# Patient Record
Sex: FEMALE | Race: WHITE | NOT HISPANIC OR LATINO | URBAN - METROPOLITAN AREA
[De-identification: names, ages, dates, MRNs, and addresses within clinical notes are randomized per-mention and may not be internally consistent; named-entity substitution may affect disease eponyms.]

---

## 2021-06-14 ENCOUNTER — TELEPHONE (OUTPATIENT)
Dept: FAMILY MEDICINE | Facility: CLINIC | Age: 56
End: 2021-06-14

## 2021-06-14 DIAGNOSIS — E03.9 HYPOTHYROIDISM, UNSPECIFIED TYPE: ICD-10-CM

## 2021-06-14 DIAGNOSIS — Z00.00 WELLNESS EXAMINATION: Primary | ICD-10-CM

## 2021-06-14 DIAGNOSIS — I10 ESSENTIAL HYPERTENSION: ICD-10-CM

## 2021-06-14 DIAGNOSIS — R73.02 GLUCOSE INTOLERANCE (IMPAIRED GLUCOSE TOLERANCE): ICD-10-CM

## 2021-06-14 RX ORDER — LEVOTHYROXINE SODIUM 88 UG/1
88 TABLET ORAL DAILY
COMMUNITY
Start: 2021-02-02 | End: 2021-06-21 | Stop reason: SDUPTHER

## 2021-06-16 ENCOUNTER — LAB VISIT (OUTPATIENT)
Dept: LAB | Facility: HOSPITAL | Age: 56
End: 2021-06-16
Attending: INTERNAL MEDICINE
Payer: COMMERCIAL

## 2021-06-16 DIAGNOSIS — Z00.00 WELLNESS EXAMINATION: ICD-10-CM

## 2021-06-16 DIAGNOSIS — R73.02 GLUCOSE INTOLERANCE (IMPAIRED GLUCOSE TOLERANCE): ICD-10-CM

## 2021-06-16 DIAGNOSIS — E03.9 HYPOTHYROIDISM, UNSPECIFIED TYPE: ICD-10-CM

## 2021-06-16 LAB
ALBUMIN SERPL BCP-MCNC: 4.1 G/DL (ref 3.5–5.2)
ALP SERPL-CCNC: 58 U/L (ref 55–135)
ALT SERPL W/O P-5'-P-CCNC: 20 U/L (ref 10–44)
ANION GAP SERPL CALC-SCNC: 13 MMOL/L (ref 8–16)
AST SERPL-CCNC: 24 U/L (ref 10–40)
BASOPHILS # BLD AUTO: 0.05 K/UL (ref 0–0.2)
BASOPHILS NFR BLD: 0.7 % (ref 0–1.9)
BILIRUB SERPL-MCNC: 0.5 MG/DL (ref 0.1–1)
BUN SERPL-MCNC: 10 MG/DL (ref 6–20)
CALCIUM SERPL-MCNC: 9.5 MG/DL (ref 8.7–10.5)
CHLORIDE SERPL-SCNC: 106 MMOL/L (ref 95–110)
CHOLEST SERPL-MCNC: 207 MG/DL (ref 120–199)
CHOLEST/HDLC SERPL: 5.3 {RATIO} (ref 2–5)
CO2 SERPL-SCNC: 22 MMOL/L (ref 23–29)
CREAT SERPL-MCNC: 0.7 MG/DL (ref 0.5–1.4)
DIFFERENTIAL METHOD: ABNORMAL
EOSINOPHIL # BLD AUTO: 0.2 K/UL (ref 0–0.5)
EOSINOPHIL NFR BLD: 3.5 % (ref 0–8)
ERYTHROCYTE [DISTWIDTH] IN BLOOD BY AUTOMATED COUNT: 12.8 % (ref 11.5–14.5)
EST. GFR  (AFRICAN AMERICAN): >60 ML/MIN/1.73 M^2
EST. GFR  (NON AFRICAN AMERICAN): >60 ML/MIN/1.73 M^2
ESTIMATED AVG GLUCOSE: 105 MG/DL (ref 68–131)
GLUCOSE SERPL-MCNC: 92 MG/DL (ref 70–110)
HBA1C MFR BLD: 5.3 % (ref 4–5.6)
HCT VFR BLD AUTO: 45.1 % (ref 37–48.5)
HDLC SERPL-MCNC: 39 MG/DL (ref 40–75)
HDLC SERPL: 18.8 % (ref 20–50)
HGB BLD-MCNC: 13.7 G/DL (ref 12–16)
IMM GRANULOCYTES # BLD AUTO: 0.02 K/UL (ref 0–0.04)
IMM GRANULOCYTES NFR BLD AUTO: 0.3 % (ref 0–0.5)
LDLC SERPL CALC-MCNC: 131 MG/DL (ref 63–159)
LYMPHOCYTES # BLD AUTO: 2.1 K/UL (ref 1–4.8)
LYMPHOCYTES NFR BLD: 29.8 % (ref 18–48)
MCH RBC QN AUTO: 30 PG (ref 27–31)
MCHC RBC AUTO-ENTMCNC: 30.4 G/DL (ref 32–36)
MCV RBC AUTO: 99 FL (ref 82–98)
MONOCYTES # BLD AUTO: 0.8 K/UL (ref 0.3–1)
MONOCYTES NFR BLD: 11.9 % (ref 4–15)
NEUTROPHILS # BLD AUTO: 3.7 K/UL (ref 1.8–7.7)
NEUTROPHILS NFR BLD: 53.8 % (ref 38–73)
NONHDLC SERPL-MCNC: 168 MG/DL
NRBC BLD-RTO: 0 /100 WBC
PLATELET # BLD AUTO: 272 K/UL (ref 150–450)
PMV BLD AUTO: 10.4 FL (ref 9.2–12.9)
POTASSIUM SERPL-SCNC: 4.2 MMOL/L (ref 3.5–5.1)
PROT SERPL-MCNC: 7.4 G/DL (ref 6–8.4)
RBC # BLD AUTO: 4.57 M/UL (ref 4–5.4)
SODIUM SERPL-SCNC: 141 MMOL/L (ref 136–145)
T4 FREE SERPL-MCNC: 0.96 NG/DL (ref 0.71–1.51)
TRIGL SERPL-MCNC: 185 MG/DL (ref 30–150)
TSH SERPL DL<=0.005 MIU/L-ACNC: 2.61 UIU/ML (ref 0.4–4)
WBC # BLD AUTO: 6.87 K/UL (ref 3.9–12.7)

## 2021-06-16 PROCEDURE — 80061 LIPID PANEL: CPT | Performed by: INTERNAL MEDICINE

## 2021-06-16 PROCEDURE — 83036 HEMOGLOBIN GLYCOSYLATED A1C: CPT | Performed by: INTERNAL MEDICINE

## 2021-06-16 PROCEDURE — 85025 COMPLETE CBC W/AUTO DIFF WBC: CPT | Performed by: INTERNAL MEDICINE

## 2021-06-16 PROCEDURE — 80053 COMPREHEN METABOLIC PANEL: CPT | Performed by: INTERNAL MEDICINE

## 2021-06-16 PROCEDURE — 36415 COLL VENOUS BLD VENIPUNCTURE: CPT | Mod: PN | Performed by: INTERNAL MEDICINE

## 2021-06-16 PROCEDURE — 84439 ASSAY OF FREE THYROXINE: CPT | Performed by: INTERNAL MEDICINE

## 2021-06-16 PROCEDURE — 84443 ASSAY THYROID STIM HORMONE: CPT | Performed by: INTERNAL MEDICINE

## 2021-06-21 ENCOUNTER — OFFICE VISIT (OUTPATIENT)
Dept: FAMILY MEDICINE | Facility: CLINIC | Age: 56
End: 2021-06-21
Payer: COMMERCIAL

## 2021-06-21 VITALS
SYSTOLIC BLOOD PRESSURE: 130 MMHG | WEIGHT: 192.81 LBS | HEIGHT: 65 IN | DIASTOLIC BLOOD PRESSURE: 70 MMHG | BODY MASS INDEX: 32.12 KG/M2

## 2021-06-21 DIAGNOSIS — E88.810 METABOLIC SYNDROME: ICD-10-CM

## 2021-06-21 DIAGNOSIS — R73.02 GLUCOSE INTOLERANCE (IMPAIRED GLUCOSE TOLERANCE): ICD-10-CM

## 2021-06-21 DIAGNOSIS — G47.33 OSA (OBSTRUCTIVE SLEEP APNEA): ICD-10-CM

## 2021-06-21 DIAGNOSIS — E03.9 HYPOTHYROIDISM, UNSPECIFIED TYPE: ICD-10-CM

## 2021-06-21 DIAGNOSIS — Z00.00 WELLNESS EXAMINATION: Primary | ICD-10-CM

## 2021-06-21 DIAGNOSIS — Z12.11 SCREEN FOR COLON CANCER: ICD-10-CM

## 2021-06-21 DIAGNOSIS — G47.30 HYPERSOMNIA WITH SLEEP APNEA: ICD-10-CM

## 2021-06-21 DIAGNOSIS — E66.9 OBESITY (BMI 30-39.9): ICD-10-CM

## 2021-06-21 DIAGNOSIS — Z12.31 VISIT FOR SCREENING MAMMOGRAM: ICD-10-CM

## 2021-06-21 DIAGNOSIS — E78.2 MIXED HYPERLIPIDEMIA: ICD-10-CM

## 2021-06-21 DIAGNOSIS — G47.10 HYPERSOMNIA WITH SLEEP APNEA: ICD-10-CM

## 2021-06-21 DIAGNOSIS — F17.200 TOBACCO USE DISORDER: ICD-10-CM

## 2021-06-21 PROCEDURE — 3008F PR BODY MASS INDEX (BMI) DOCUMENTED: ICD-10-PCS | Mod: CPTII,S$GLB,, | Performed by: INTERNAL MEDICINE

## 2021-06-21 PROCEDURE — 1126F PR PAIN SEVERITY QUANTIFIED, NO PAIN PRESENT: ICD-10-PCS | Mod: S$GLB,,, | Performed by: INTERNAL MEDICINE

## 2021-06-21 PROCEDURE — 3008F BODY MASS INDEX DOCD: CPT | Mod: CPTII,S$GLB,, | Performed by: INTERNAL MEDICINE

## 2021-06-21 PROCEDURE — 1126F AMNT PAIN NOTED NONE PRSNT: CPT | Mod: S$GLB,,, | Performed by: INTERNAL MEDICINE

## 2021-06-21 PROCEDURE — 99999 PR PBB SHADOW E&M-EST. PATIENT-LVL III: ICD-10-PCS | Mod: PBBFAC,,, | Performed by: INTERNAL MEDICINE

## 2021-06-21 PROCEDURE — 99396 PREV VISIT EST AGE 40-64: CPT | Mod: S$GLB,,, | Performed by: INTERNAL MEDICINE

## 2021-06-21 PROCEDURE — 99396 PR PREVENTIVE VISIT,EST,40-64: ICD-10-PCS | Mod: S$GLB,,, | Performed by: INTERNAL MEDICINE

## 2021-06-21 PROCEDURE — 99999 PR PBB SHADOW E&M-EST. PATIENT-LVL III: CPT | Mod: PBBFAC,,, | Performed by: INTERNAL MEDICINE

## 2021-06-21 RX ORDER — ARMODAFINIL 150 MG/1
150 TABLET ORAL DAILY
Qty: 30 TABLET | Refills: 5 | Status: SHIPPED | OUTPATIENT
Start: 2021-06-21 | End: 2021-07-21

## 2021-06-21 RX ORDER — LEVOTHYROXINE SODIUM 88 UG/1
88 TABLET ORAL DAILY
Qty: 90 TABLET | Refills: 3 | Status: SHIPPED | OUTPATIENT
Start: 2021-06-21 | End: 2022-02-15 | Stop reason: SDUPTHER

## 2021-09-13 LAB — PAP SMEAR: NORMAL

## 2021-10-12 LAB — NONINV COLON CA DNA+OCC BLD SCRN STL QL: NEGATIVE

## 2021-12-29 DIAGNOSIS — Z12.11 COLON CANCER SCREENING: ICD-10-CM

## 2022-02-15 ENCOUNTER — OFFICE VISIT (OUTPATIENT)
Dept: FAMILY MEDICINE | Facility: CLINIC | Age: 57
End: 2022-02-15
Payer: COMMERCIAL

## 2022-02-15 ENCOUNTER — PATIENT OUTREACH (OUTPATIENT)
Dept: ADMINISTRATIVE | Facility: HOSPITAL | Age: 57
End: 2022-02-15
Payer: COMMERCIAL

## 2022-02-15 VITALS
SYSTOLIC BLOOD PRESSURE: 126 MMHG | WEIGHT: 198.06 LBS | DIASTOLIC BLOOD PRESSURE: 74 MMHG | BODY MASS INDEX: 33 KG/M2 | HEIGHT: 65 IN

## 2022-02-15 DIAGNOSIS — E66.9 OBESITY (BMI 30-39.9): ICD-10-CM

## 2022-02-15 DIAGNOSIS — Z00.00 WELLNESS EXAMINATION: Primary | ICD-10-CM

## 2022-02-15 DIAGNOSIS — R73.02 GLUCOSE INTOLERANCE (IMPAIRED GLUCOSE TOLERANCE): ICD-10-CM

## 2022-02-15 DIAGNOSIS — E03.9 HYPOTHYROIDISM, UNSPECIFIED TYPE: ICD-10-CM

## 2022-02-15 DIAGNOSIS — E78.2 MIXED HYPERLIPIDEMIA: ICD-10-CM

## 2022-02-15 DIAGNOSIS — E88.810 METABOLIC SYNDROME: ICD-10-CM

## 2022-02-15 DIAGNOSIS — G47.33 OSA (OBSTRUCTIVE SLEEP APNEA): ICD-10-CM

## 2022-02-15 PROCEDURE — 99214 PR OFFICE/OUTPT VISIT, EST, LEVL IV, 30-39 MIN: ICD-10-PCS | Mod: S$GLB,,, | Performed by: INTERNAL MEDICINE

## 2022-02-15 PROCEDURE — 3074F SYST BP LT 130 MM HG: CPT | Mod: CPTII,S$GLB,, | Performed by: INTERNAL MEDICINE

## 2022-02-15 PROCEDURE — 99999 PR PBB SHADOW E&M-EST. PATIENT-LVL III: CPT | Mod: PBBFAC,,, | Performed by: INTERNAL MEDICINE

## 2022-02-15 PROCEDURE — 3074F PR MOST RECENT SYSTOLIC BLOOD PRESSURE < 130 MM HG: ICD-10-PCS | Mod: CPTII,S$GLB,, | Performed by: INTERNAL MEDICINE

## 2022-02-15 PROCEDURE — 1159F PR MEDICATION LIST DOCUMENTED IN MEDICAL RECORD: ICD-10-PCS | Mod: CPTII,S$GLB,, | Performed by: INTERNAL MEDICINE

## 2022-02-15 PROCEDURE — 3078F PR MOST RECENT DIASTOLIC BLOOD PRESSURE < 80 MM HG: ICD-10-PCS | Mod: CPTII,S$GLB,, | Performed by: INTERNAL MEDICINE

## 2022-02-15 PROCEDURE — 3078F DIAST BP <80 MM HG: CPT | Mod: CPTII,S$GLB,, | Performed by: INTERNAL MEDICINE

## 2022-02-15 PROCEDURE — 1159F MED LIST DOCD IN RCRD: CPT | Mod: CPTII,S$GLB,, | Performed by: INTERNAL MEDICINE

## 2022-02-15 PROCEDURE — 99999 PR PBB SHADOW E&M-EST. PATIENT-LVL III: ICD-10-PCS | Mod: PBBFAC,,, | Performed by: INTERNAL MEDICINE

## 2022-02-15 PROCEDURE — 3008F BODY MASS INDEX DOCD: CPT | Mod: CPTII,S$GLB,, | Performed by: INTERNAL MEDICINE

## 2022-02-15 PROCEDURE — 99214 OFFICE O/P EST MOD 30 MIN: CPT | Mod: S$GLB,,, | Performed by: INTERNAL MEDICINE

## 2022-02-15 PROCEDURE — 3008F PR BODY MASS INDEX (BMI) DOCUMENTED: ICD-10-PCS | Mod: CPTII,S$GLB,, | Performed by: INTERNAL MEDICINE

## 2022-02-15 RX ORDER — LEVOTHYROXINE SODIUM 88 UG/1
88 TABLET ORAL DAILY
Qty: 90 TABLET | Refills: 3 | Status: SHIPPED | OUTPATIENT
Start: 2022-02-15 | End: 2023-02-10 | Stop reason: SDUPTHER

## 2022-02-15 RX ORDER — PHENTERMINE AND TOPIRAMATE 3.75; 23 MG/1; MG/1
1 CAPSULE, EXTENDED RELEASE ORAL DAILY
Qty: 14 CAPSULE | Refills: 0 | Status: SHIPPED | OUTPATIENT
Start: 2022-02-15 | End: 2022-03-01

## 2022-02-15 RX ORDER — PHENTERMINE AND TOPIRAMATE 7.5; 46 MG/1; MG/1
1 CAPSULE, EXTENDED RELEASE ORAL DAILY
Qty: 30 CAPSULE | Refills: 1 | Status: SHIPPED | OUTPATIENT
Start: 2022-02-15 | End: 2022-03-17

## 2022-02-15 NOTE — PROGRESS NOTES
"Subjective:    get old note .      Patient ID: Candice Cohn is a 57 y.o. female.    Chief Complaint: Follow-up  Gyn: Dr Tian   MM   Dexa:  Never  Colonoscopy:  10/2021 cologaurd negative   Immunizations: Flu: no Tdap:  Not given old note, Pneumovax: next visit  Shingles: recommend  Covid: no   Smoker:  No     Follow-up  Pertinent negatives include no chest pain or joint swelling.      Metabolic syndrome: BMI 32 - wants to restart Qsymia- did will in past.  Goal 145-150  lbs   Hypothyroid:  Controlled Rx L 88 TSH 2.6.  TPO negative// ultrasound 2/15 negative.  Mixed hyperlipidemia:  Improved diet changes //   Triglycerides 185, , HDL 39. // Highest .  Glucose intolerance:  Improved G 92 //  // A1c 5.3   ALAN:  Dx 2019 AEH high 28.  //Rx CPAP nightly pressure 5-20 auto PAP.  using greater than 4 hr. mgmt ENT Dr Graham     Review of Systems:  Review of Systems   Constitutional: Negative for appetite change.   HENT: Negative for nosebleeds.    Eyes: Negative for pain.   Respiratory: Negative for choking.    Cardiovascular: Negative for chest pain.   Gastrointestinal: Negative for blood in stool.   Genitourinary: Negative for hematuria.   Musculoskeletal: Negative for joint swelling.   Skin: Negative for pallor.   Neurological: Negative for facial asymmetry.   Hematological: Does not bruise/bleed easily.   Psychiatric/Behavioral: Negative for confusion.       Objective:     Vitals:    02/15/22 0837   BP: 126/74   BP Location: Right arm   Weight: 89.8 kg (198 lb 1.3 oz)   Height: 5' 5" (1.651 m)          Physical Exam  Vitals reviewed.   Constitutional:       Appearance: Normal appearance.   HENT:      Head: Normocephalic and atraumatic.      Mouth/Throat:      Pharynx: Oropharynx is clear.   Eyes:      Extraocular Movements: Extraocular movements intact.      Conjunctiva/sclera: Conjunctivae normal.      Pupils: Pupils are equal, round, and reactive to light.   Cardiovascular: "      Rate and Rhythm: Normal rate and regular rhythm.      Heart sounds: Normal heart sounds.   Pulmonary:      Effort: Pulmonary effort is normal.      Breath sounds: Normal breath sounds.   Abdominal:      General: Bowel sounds are normal.      Palpations: Abdomen is soft.   Musculoskeletal:         General: Normal range of motion.      Cervical back: Normal range of motion and neck supple.   Skin:     General: Skin is warm and dry.   Neurological:      General: No focal deficit present.      Mental Status: She is alert and oriented to person, place, and time.   Psychiatric:         Mood and Affect: Mood normal.         Medication List with Changes/Refills   New Medications    PHENTERMINE-TOPIRAMATE (QSYMIA) 3.75-23 MG CM24    Take 1 capsule by mouth once daily. for 14 days    PHENTERMINE-TOPIRAMATE (QSYMIA) 7.5-46 MG CM24    Take 1 capsule by mouth once daily.   Current Medications    ASCORBIC ACID (VITAMIN C ORAL)    Take by mouth.    BIOTIN ORAL    Take by mouth.    CALCIUM CARBONATE/VITAMIN D3 (VITAMIN D-3 ORAL)    Take by mouth.    CYANOCOBALAMIN, VITAMIN B-12, (VITAMIN B-12 ORAL)    Take by mouth.    FEXOFENADINE HCL (ALLEGRA ORAL)    Take by mouth.   Changed and/or Refilled Medications    Modified Medication Previous Medication    LEVOTHYROXINE (SYNTHROID) 88 MCG TABLET levothyroxine (SYNTHROID) 88 MCG tablet       Take 1 tablet (88 mcg total) by mouth once daily.    Take 1 tablet (88 mcg total) by mouth once daily.       Assessment & Plan:  1. Wellness examination  - Comprehensive Metabolic Panel; Future  - Hemoglobin A1C; Future  - Lipid Panel; Future  - TSH; Future  - T4, Free; Future  - CBC Auto Differential; Future  - Comprehensive Metabolic Panel  - Hemoglobin A1C  - Lipid Panel  - TSH  - T4, Free  - CBC Auto Differential    2. Hypothyroidism, unspecified type  - TSH; Future  - T4, Free; Future  - TSH  - T4, Free    3. Glucose intolerance (impaired glucose tolerance)  - Hemoglobin A1C; Future  -  Hemoglobin A1C    4. ALAN (obstructive sleep apnea)    5. Metabolic syndrome    6. Mixed hyperlipidemia    7. Obesity (BMI 30-39.9)     Wellness examination  -     Comprehensive Metabolic Panel; Future; Expected date: 02/15/2022  -     Hemoglobin A1C; Future; Expected date: 02/15/2022  -     Lipid Panel; Future; Expected date: 02/15/2022  -     TSH; Future; Expected date: 02/15/2022  -     T4, Free; Future; Expected date: 02/15/2022  -     CBC Auto Differential; Future; Expected date: 02/15/2022    Hypothyroidism, unspecified type  -     TSH; Future; Expected date: 02/15/2022  -     T4, Free; Future; Expected date: 02/15/2022    Glucose intolerance (impaired glucose tolerance)  -     Hemoglobin A1C; Future; Expected date: 02/15/2022    ALAN (obstructive sleep apnea)    Metabolic syndrome    Mixed hyperlipidemia    Obesity (BMI 30-39.9)    Other orders  -     phentermine-topiramate (QSYMIA) 3.75-23 mg CM24; Take 1 capsule by mouth once daily. for 14 days  Dispense: 14 capsule; Refill: 0  -     phentermine-topiramate (QSYMIA) 7.5-46 mg CM24; Take 1 capsule by mouth once daily.  Dispense: 30 capsule; Refill: 1  -     levothyroxine (SYNTHROID) 88 MCG tablet; Take 1 tablet (88 mcg total) by mouth once daily.  Dispense: 90 tablet; Refill: 3        Continue to work on regular exercise, maintain healthy weight, balanced diet. Avoid unhealthy habits: smoking, excessive alcohol intake.

## 2022-02-15 NOTE — LETTER
AUTHORIZATION FOR RELEASE OF   CONFIDENTIAL INFORMATION    Dear Kodak Tian MD    We are seeing Candice Cohn, date of birth 1965, in the clinic at [facility]. Levi Addison MD is the patient's PCP. Candice Cohn has an outstanding lab/procedure at the time we reviewed her chart. In order to help keep her health information updated, she has authorized us to request the following medical record(s):        (  )  MAMMOGRAM                                      (  )  COLONOSCOPY      (X)  PAP SMEAR                                          (  )  OUTSIDE LAB RESULTS     (  )  DEXA SCAN                                          (  )  EYE EXAM            (  )  FOOT EXAM                                          (  )  ENTIRE RECORD     (  )  OUTSIDE IMMUNIZATIONS                 (  )  _______________         Please fax records to [facility], Brandon D Simon-Davis, MD, [phone]    If you have any questions, please contact Dionicio Self LPN Care Coordinator  at [phone].              Patient Name: Candice Cohn  : 1965  Patient Phone #: [phone]

## 2022-02-21 ENCOUNTER — PATIENT MESSAGE (OUTPATIENT)
Dept: FAMILY MEDICINE | Facility: CLINIC | Age: 57
End: 2022-02-21
Payer: COMMERCIAL

## 2022-02-21 DIAGNOSIS — Z00.00 WELLNESS EXAMINATION: Primary | ICD-10-CM

## 2022-02-21 DIAGNOSIS — E03.9 HYPOTHYROIDISM, UNSPECIFIED TYPE: ICD-10-CM

## 2022-02-21 DIAGNOSIS — R73.02 GLUCOSE INTOLERANCE (IMPAIRED GLUCOSE TOLERANCE): ICD-10-CM

## 2022-07-06 DIAGNOSIS — Z12.31 OTHER SCREENING MAMMOGRAM: ICD-10-CM

## 2022-07-21 ENCOUNTER — PATIENT MESSAGE (OUTPATIENT)
Dept: FAMILY MEDICINE | Facility: CLINIC | Age: 57
End: 2022-07-21
Payer: COMMERCIAL

## 2022-07-21 DIAGNOSIS — E03.9 HYPOTHYROIDISM, UNSPECIFIED TYPE: ICD-10-CM

## 2022-07-21 DIAGNOSIS — R73.02 GLUCOSE INTOLERANCE (IMPAIRED GLUCOSE TOLERANCE): ICD-10-CM

## 2022-07-21 DIAGNOSIS — E78.2 MIXED HYPERLIPIDEMIA: ICD-10-CM

## 2022-07-21 DIAGNOSIS — E66.9 OBESITY (BMI 30-39.9): Primary | ICD-10-CM

## 2022-07-21 DIAGNOSIS — Z00.00 WELLNESS EXAMINATION: ICD-10-CM

## 2022-08-15 ENCOUNTER — OFFICE VISIT (OUTPATIENT)
Dept: FAMILY MEDICINE | Facility: CLINIC | Age: 57
End: 2022-08-15
Payer: COMMERCIAL

## 2022-08-15 VITALS
HEART RATE: 66 BPM | WEIGHT: 194.25 LBS | SYSTOLIC BLOOD PRESSURE: 122 MMHG | HEIGHT: 65 IN | DIASTOLIC BLOOD PRESSURE: 72 MMHG | BODY MASS INDEX: 32.36 KG/M2 | OXYGEN SATURATION: 98 %

## 2022-08-15 DIAGNOSIS — E03.9 HYPOTHYROIDISM, UNSPECIFIED TYPE: ICD-10-CM

## 2022-08-15 DIAGNOSIS — Z01.89 ENCOUNTER FOR ROUTINE LABORATORY TESTING: ICD-10-CM

## 2022-08-15 DIAGNOSIS — E78.2 MIXED HYPERLIPIDEMIA: ICD-10-CM

## 2022-08-15 DIAGNOSIS — Z11.59 NEED FOR HEPATITIS C SCREENING TEST: ICD-10-CM

## 2022-08-15 DIAGNOSIS — E66.9 OBESITY (BMI 30-39.9): ICD-10-CM

## 2022-08-15 DIAGNOSIS — R73.02 GLUCOSE INTOLERANCE (IMPAIRED GLUCOSE TOLERANCE): Primary | ICD-10-CM

## 2022-08-15 PROCEDURE — 99214 OFFICE O/P EST MOD 30 MIN: CPT | Mod: S$GLB,,, | Performed by: INTERNAL MEDICINE

## 2022-08-15 PROCEDURE — 3078F PR MOST RECENT DIASTOLIC BLOOD PRESSURE < 80 MM HG: ICD-10-PCS | Mod: CPTII,S$GLB,, | Performed by: INTERNAL MEDICINE

## 2022-08-15 PROCEDURE — 3044F PR MOST RECENT HEMOGLOBIN A1C LEVEL <7.0%: ICD-10-PCS | Mod: CPTII,S$GLB,, | Performed by: INTERNAL MEDICINE

## 2022-08-15 PROCEDURE — 1159F MED LIST DOCD IN RCRD: CPT | Mod: CPTII,S$GLB,, | Performed by: INTERNAL MEDICINE

## 2022-08-15 PROCEDURE — 3078F DIAST BP <80 MM HG: CPT | Mod: CPTII,S$GLB,, | Performed by: INTERNAL MEDICINE

## 2022-08-15 PROCEDURE — 1160F PR REVIEW ALL MEDS BY PRESCRIBER/CLIN PHARMACIST DOCUMENTED: ICD-10-PCS | Mod: CPTII,S$GLB,, | Performed by: INTERNAL MEDICINE

## 2022-08-15 PROCEDURE — 3008F PR BODY MASS INDEX (BMI) DOCUMENTED: ICD-10-PCS | Mod: CPTII,S$GLB,, | Performed by: INTERNAL MEDICINE

## 2022-08-15 PROCEDURE — 1160F RVW MEDS BY RX/DR IN RCRD: CPT | Mod: CPTII,S$GLB,, | Performed by: INTERNAL MEDICINE

## 2022-08-15 PROCEDURE — 99999 PR PBB SHADOW E&M-EST. PATIENT-LVL III: CPT | Mod: PBBFAC,,, | Performed by: INTERNAL MEDICINE

## 2022-08-15 PROCEDURE — 3008F BODY MASS INDEX DOCD: CPT | Mod: CPTII,S$GLB,, | Performed by: INTERNAL MEDICINE

## 2022-08-15 PROCEDURE — 3074F PR MOST RECENT SYSTOLIC BLOOD PRESSURE < 130 MM HG: ICD-10-PCS | Mod: CPTII,S$GLB,, | Performed by: INTERNAL MEDICINE

## 2022-08-15 PROCEDURE — 99999 PR PBB SHADOW E&M-EST. PATIENT-LVL III: ICD-10-PCS | Mod: PBBFAC,,, | Performed by: INTERNAL MEDICINE

## 2022-08-15 PROCEDURE — 99214 PR OFFICE/OUTPT VISIT, EST, LEVL IV, 30-39 MIN: ICD-10-PCS | Mod: S$GLB,,, | Performed by: INTERNAL MEDICINE

## 2022-08-15 PROCEDURE — 1159F PR MEDICATION LIST DOCUMENTED IN MEDICAL RECORD: ICD-10-PCS | Mod: CPTII,S$GLB,, | Performed by: INTERNAL MEDICINE

## 2022-08-15 PROCEDURE — 3074F SYST BP LT 130 MM HG: CPT | Mod: CPTII,S$GLB,, | Performed by: INTERNAL MEDICINE

## 2022-08-15 PROCEDURE — 3044F HG A1C LEVEL LT 7.0%: CPT | Mod: CPTII,S$GLB,, | Performed by: INTERNAL MEDICINE

## 2022-08-15 RX ORDER — ONDANSETRON 4 MG/1
4 TABLET, ORALLY DISINTEGRATING ORAL EVERY 6 HOURS PRN
Qty: 30 TABLET | Refills: 0 | Status: SHIPPED | OUTPATIENT
Start: 2022-08-15

## 2022-08-15 RX ORDER — SEMAGLUTIDE 1.34 MG/ML
INJECTION, SOLUTION SUBCUTANEOUS
Qty: 2 PEN | Refills: 3 | Status: SHIPPED | OUTPATIENT
Start: 2022-08-15 | End: 2023-02-10 | Stop reason: SDUPTHER

## 2022-08-15 NOTE — PROGRESS NOTES
"Subjective:    old note in      Patient ID: Candice Cohn is a 57 y.o. female.    Chief Complaint: Follow-up (Blood work)  Gyn: Dr Tian   MM2022   Dexa:  Never  Colonoscopy:  10/2021 cologaurd negative   Immunizations: Flu: defers Tdap:  Not on old note due Pneumovax: next visit  Shingles: recommend  Covid: no   Smoker:  Yes      Follow-up  Pertinent negatives include no chest pain or joint swelling.      F/u labs    Hypothyroid:  Controlled Rx L 88  TPO negative// ultrasound 2/15 negative.  Mixed hyperlipidemia: uncontrolled LDL  /  , Tg  176    Glucose intolerance: increased  G 119 //  A1c 5.9  (hx of using Metformin)   ALAN:  Dx 2019 AEH high 28.  //Rx CPAP nightly pressure 5-20 auto PAP.  using greater than 4 hr. mgmt ENT Dr Graham   Metabolic syndrome: BMI 32 weight 194/// prev took Qsymia  Goal 145-150  lbs     Review of Systems:  Review of Systems   Constitutional: Negative for appetite change.   HENT: Negative for nosebleeds.    Eyes: Negative for pain.   Respiratory: Negative for choking.    Cardiovascular: Negative for chest pain.   Gastrointestinal: Negative for blood in stool.   Genitourinary: Negative for hematuria.   Musculoskeletal: Negative for joint swelling.   Skin: Negative for pallor.   Neurological: Negative for facial asymmetry.   Hematological: Does not bruise/bleed easily.   Psychiatric/Behavioral: Negative for confusion.       Objective:     Vitals:    08/15/22 1119   BP: 122/72   Pulse: 66   SpO2: 98%   Weight: 88.1 kg (194 lb 3.6 oz)   Height: 5' 5" (1.651 m)          Physical Exam  Vitals reviewed.   Constitutional:       Appearance: Normal appearance.   HENT:      Head: Normocephalic and atraumatic.      Mouth/Throat:      Pharynx: Oropharynx is clear.   Eyes:      Extraocular Movements: Extraocular movements intact.      Conjunctiva/sclera: Conjunctivae normal.      Pupils: Pupils are equal, round, and reactive to light.   Cardiovascular:      Rate and " Rhythm: Normal rate and regular rhythm.      Heart sounds: Normal heart sounds.   Pulmonary:      Effort: Pulmonary effort is normal.      Breath sounds: Normal breath sounds.   Abdominal:      General: Bowel sounds are normal.      Palpations: Abdomen is soft.   Musculoskeletal:         General: Normal range of motion.      Cervical back: Normal range of motion and neck supple.   Skin:     General: Skin is warm and dry.   Neurological:      General: No focal deficit present.      Mental Status: She is alert and oriented to person, place, and time.   Psychiatric:         Mood and Affect: Mood normal.         Medication List with Changes/Refills   New Medications    ONDANSETRON (ZOFRAN-ODT) 4 MG TBDL    Take 1 tablet (4 mg total) by mouth every 6 (six) hours as needed (nausea).    SEMAGLUTIDE (OZEMPIC) 0.25 MG OR 0.5 MG(2 MG/1.5 ML) PEN INJECTOR    Inject 0.25 mg SQ weekly x 4 wks; then increase to 0.5 mg SQ weekly   Current Medications    ASCORBIC ACID (VITAMIN C ORAL)    Take by mouth.    BIOTIN ORAL    Take by mouth.    CALCIUM CARBONATE/VITAMIN D3 (VITAMIN D-3 ORAL)    Take by mouth.    CYANOCOBALAMIN, VITAMIN B-12, (VITAMIN B-12 ORAL)    Take by mouth.    FEXOFENADINE HCL (ALLEGRA ORAL)    Take by mouth.    LEVOTHYROXINE (SYNTHROID) 88 MCG TABLET    Take 1 tablet (88 mcg total) by mouth once daily.       Assessment & Plan:  1. Glucose intolerance (impaired glucose tolerance)  - semaglutide (OZEMPIC) 0.25 mg or 0.5 mg(2 mg/1.5 mL) pen injector; Inject 0.25 mg SQ weekly x 4 wks; then increase to 0.5 mg SQ weekly  Dispense: 2 pen; Refill: 3  - Comprehensive Metabolic Panel; Future  - Hemoglobin A1C; Future    2. Mixed hyperlipidemia  - Lipid Panel; Future    3. Hypothyroidism, unspecified type  - T4, Free; Future    4. Need for hepatitis C screening test  - Hepatitis C Antibody; Future    5. Encounter for routine laboratory testing  - CBC Auto Differential; Future  - Comprehensive Metabolic Panel; Future  -  Hemoglobin A1C; Future  - Lipid Panel; Future  - TSH; Future  - T4, Free; Future  - Hepatitis C Antibody; Future    6. Obesity (BMI 30-39.9)     Glucose intolerance (impaired glucose tolerance)  Comments:  start Ozempic - prev took Metformin   Orders:  -     semaglutide (OZEMPIC) 0.25 mg or 0.5 mg(2 mg/1.5 mL) pen injector; Inject 0.25 mg SQ weekly x 4 wks; then increase to 0.5 mg SQ weekly  Dispense: 2 pen; Refill: 3  -     Comprehensive Metabolic Panel; Future; Expected date: 08/15/2022  -     Hemoglobin A1C; Future; Expected date: 08/15/2022    Mixed hyperlipidemia  Comments:  add Red Yeast Rice   Orders:  -     Lipid Panel; Future; Expected date: 08/15/2022    Hypothyroidism, unspecified type  -     T4, Free; Future; Expected date: 08/15/2022    Need for hepatitis C screening test  -     Hepatitis C Antibody; Future; Expected date: 08/15/2022    Encounter for routine laboratory testing  -     CBC Auto Differential; Future; Expected date: 08/15/2022  -     Comprehensive Metabolic Panel; Future; Expected date: 08/15/2022  -     Hemoglobin A1C; Future; Expected date: 08/15/2022  -     Lipid Panel; Future; Expected date: 08/15/2022  -     TSH; Future; Expected date: 08/15/2022  -     T4, Free; Future; Expected date: 08/15/2022  -     Hepatitis C Antibody; Future; Expected date: 08/15/2022    Obesity (BMI 30-39.9)    Other orders  -     ondansetron (ZOFRAN-ODT) 4 MG TbDL; Take 1 tablet (4 mg total) by mouth every 6 (six) hours as needed (nausea).  Dispense: 30 tablet; Refill: 0        Continue to work on regular exercise, maintain healthy weight, balanced diet. Avoid unhealthy habits: smoking, excessive alcohol intake.

## 2022-12-07 ENCOUNTER — TELEPHONE (OUTPATIENT)
Dept: FAMILY MEDICINE | Facility: CLINIC | Age: 57
End: 2022-12-07
Payer: COMMERCIAL

## 2022-12-07 NOTE — TELEPHONE ENCOUNTER
Scheduled appt for 2/7/23. Notified pt blood work orders available to do fasting 1 week prior to appt.

## 2022-12-07 NOTE — TELEPHONE ENCOUNTER
----- Message from Mary Helms sent at 12/7/2022 11:25 AM CST -----  Contact: [phone]    Patient: Candice Cohn    Reason: She is requesting a call back from the nurse to get assistance with scheduling a 6 month     follow-up appointment.    Comments: Please call the patient back to advise.

## 2023-02-07 ENCOUNTER — OFFICE VISIT (OUTPATIENT)
Dept: FAMILY MEDICINE | Facility: CLINIC | Age: 58
End: 2023-02-07
Payer: COMMERCIAL

## 2023-02-07 VITALS
HEART RATE: 74 BPM | OXYGEN SATURATION: 97 % | SYSTOLIC BLOOD PRESSURE: 118 MMHG | DIASTOLIC BLOOD PRESSURE: 84 MMHG | BODY MASS INDEX: 31.59 KG/M2 | WEIGHT: 189.63 LBS | RESPIRATION RATE: 16 BRPM | HEIGHT: 65 IN

## 2023-02-07 DIAGNOSIS — E78.2 MIXED HYPERLIPIDEMIA: ICD-10-CM

## 2023-02-07 DIAGNOSIS — E55.9 VITAMIN D DEFICIENCY: ICD-10-CM

## 2023-02-07 DIAGNOSIS — R73.02 GLUCOSE INTOLERANCE (IMPAIRED GLUCOSE TOLERANCE): ICD-10-CM

## 2023-02-07 DIAGNOSIS — R73.03 PRE-DIABETES: ICD-10-CM

## 2023-02-07 DIAGNOSIS — Z00.00 WELLNESS EXAMINATION: Primary | ICD-10-CM

## 2023-02-07 DIAGNOSIS — G47.33 OSA (OBSTRUCTIVE SLEEP APNEA): ICD-10-CM

## 2023-02-07 DIAGNOSIS — Z01.89 ENCOUNTER FOR ROUTINE LABORATORY TESTING: ICD-10-CM

## 2023-02-07 DIAGNOSIS — E03.9 HYPOTHYROIDISM, UNSPECIFIED TYPE: ICD-10-CM

## 2023-02-07 PROCEDURE — 99999 PR PBB SHADOW E&M-EST. PATIENT-LVL III: ICD-10-PCS | Mod: PBBFAC,,, | Performed by: INTERNAL MEDICINE

## 2023-02-07 PROCEDURE — 3079F DIAST BP 80-89 MM HG: CPT | Mod: CPTII,S$GLB,, | Performed by: INTERNAL MEDICINE

## 2023-02-07 PROCEDURE — 99396 PR PREVENTIVE VISIT,EST,40-64: ICD-10-PCS | Mod: S$GLB,,, | Performed by: INTERNAL MEDICINE

## 2023-02-07 PROCEDURE — 3008F BODY MASS INDEX DOCD: CPT | Mod: CPTII,S$GLB,, | Performed by: INTERNAL MEDICINE

## 2023-02-07 PROCEDURE — 3079F PR MOST RECENT DIASTOLIC BLOOD PRESSURE 80-89 MM HG: ICD-10-PCS | Mod: CPTII,S$GLB,, | Performed by: INTERNAL MEDICINE

## 2023-02-07 PROCEDURE — 99999 PR PBB SHADOW E&M-EST. PATIENT-LVL III: CPT | Mod: PBBFAC,,, | Performed by: INTERNAL MEDICINE

## 2023-02-07 PROCEDURE — 1159F MED LIST DOCD IN RCRD: CPT | Mod: CPTII,S$GLB,, | Performed by: INTERNAL MEDICINE

## 2023-02-07 PROCEDURE — 1160F RVW MEDS BY RX/DR IN RCRD: CPT | Mod: CPTII,S$GLB,, | Performed by: INTERNAL MEDICINE

## 2023-02-07 PROCEDURE — 99396 PREV VISIT EST AGE 40-64: CPT | Mod: S$GLB,,, | Performed by: INTERNAL MEDICINE

## 2023-02-07 PROCEDURE — 1160F PR REVIEW ALL MEDS BY PRESCRIBER/CLIN PHARMACIST DOCUMENTED: ICD-10-PCS | Mod: CPTII,S$GLB,, | Performed by: INTERNAL MEDICINE

## 2023-02-07 PROCEDURE — 3044F PR MOST RECENT HEMOGLOBIN A1C LEVEL <7.0%: ICD-10-PCS | Mod: CPTII,S$GLB,, | Performed by: INTERNAL MEDICINE

## 2023-02-07 PROCEDURE — 3044F HG A1C LEVEL LT 7.0%: CPT | Mod: CPTII,S$GLB,, | Performed by: INTERNAL MEDICINE

## 2023-02-07 PROCEDURE — 3008F PR BODY MASS INDEX (BMI) DOCUMENTED: ICD-10-PCS | Mod: CPTII,S$GLB,, | Performed by: INTERNAL MEDICINE

## 2023-02-07 PROCEDURE — 3074F PR MOST RECENT SYSTOLIC BLOOD PRESSURE < 130 MM HG: ICD-10-PCS | Mod: CPTII,S$GLB,, | Performed by: INTERNAL MEDICINE

## 2023-02-07 PROCEDURE — 1159F PR MEDICATION LIST DOCUMENTED IN MEDICAL RECORD: ICD-10-PCS | Mod: CPTII,S$GLB,, | Performed by: INTERNAL MEDICINE

## 2023-02-07 PROCEDURE — 3074F SYST BP LT 130 MM HG: CPT | Mod: CPTII,S$GLB,, | Performed by: INTERNAL MEDICINE

## 2023-02-07 NOTE — PROGRESS NOTES
Subjective:       Patient ID: Candice Cohn is a 58 y.o. female.    Chief Complaint: Follow-up and Annual Exam   Follow-up  Pertinent negatives include no chest pain or joint swelling.     Gyn: Dr Tian  MM2022   Dexa:  Never  Colonoscopy:  10/2021 cologaurd negative   Immunizations: Flu: defers Tdap:  Not on old note due Pneumovax: next visit  Shingles: recommend  Covid: no   Smoker:  Yes    Eye:   Note in     Wellness / lab review      Hypothyroid:  suppressed- same rx dose for years.  (Cut to 6 pills wkly) //   Rx Levo 88  TPO negative// ultrasound 2/15 negative.    Mixed hyperlipidemia: uncontrolled (highest L 184) never took meds.  Add fish oil and fiber supplement. R/c consider CT calcium     Glucose intolerance: A1c 5.2   (hx of using Metformin) Rx Ozempic 0.5     ALAN:  Dx 2019 AEH high 28.  //Rx CPAP nightly pressure 5-20 auto PAP.  using greater than 4 hr. mgmt ENT Dr Graham     Metabolic syndrome: BMI 32 & 194, 31 & 189. // prev took Qsymia  Goal 145-150  lbs        ____________________________________________________________________________________________________  Assessment & Plan:  1. Wellness examination    2. Mixed hyperlipidemia  - Lipid Panel; Future    3. Pre-diabetes    4. Glucose intolerance (impaired glucose tolerance)  - Hemoglobin A1C; Future  - Comprehensive Metabolic Panel; Future    5. Hypothyroidism, unspecified type  - TSH; Future  - T4, Free; Future    6. ALAN (obstructive sleep apnea)    7. Encounter for routine laboratory testing  - Lipid Panel; Future  - Hemoglobin A1C; Future  - Comprehensive Metabolic Panel; Future  - TSH; Future  - T4, Free; Future  - Vitamin D; Future    8. Vitamin D deficiency  - Vitamin D; Future     Wellness examination    Mixed hyperlipidemia  -     Lipid Panel; Future; Expected date: 2023    Pre-diabetes    Glucose intolerance (impaired glucose tolerance)  -     Hemoglobin A1C; Future; Expected date: 2023  -      Comprehensive Metabolic Panel; Future; Expected date: 02/07/2023    Hypothyroidism, unspecified type  -     TSH; Future; Expected date: 02/07/2023  -     T4, Free; Future; Expected date: 02/07/2023    ALAN (obstructive sleep apnea)    Encounter for routine laboratory testing  -     Lipid Panel; Future; Expected date: 02/07/2023  -     Hemoglobin A1C; Future; Expected date: 02/07/2023  -     Comprehensive Metabolic Panel; Future; Expected date: 02/07/2023  -     TSH; Future; Expected date: 02/07/2023  -     T4, Free; Future; Expected date: 02/07/2023  -     Vitamin D; Future; Expected date: 02/07/2023    Vitamin D deficiency  -     Vitamin D; Future; Expected date: 02/07/2023        Continue to work on regular exercise, maintain healthy weight, balanced diet. Avoid unhealthy habits: smoking, excessive alcohol intake.     Disclaimer: This note was partly generated using dictation software which may occasionally result in transcription errors  ____________________________________________________________________________________________________  Review of Systems:  Review of Systems   Constitutional:  Negative for appetite change.   HENT:  Negative for nosebleeds.    Eyes:  Negative for pain.   Respiratory:  Negative for choking.    Cardiovascular:  Negative for chest pain.   Gastrointestinal:  Negative for blood in stool.   Genitourinary:  Negative for hematuria.   Musculoskeletal:  Negative for joint swelling.   Skin:  Negative for pallor.   Neurological:  Negative for facial asymmetry.   Hematological:  Does not bruise/bleed easily.   Psychiatric/Behavioral:  Negative for confusion.      Objective:     Wt Readings from Last 3 Encounters:   02/07/23 86 kg (189 lb 9.5 oz)   08/15/22 88.1 kg (194 lb 3.6 oz)   02/15/22 89.8 kg (198 lb 1.3 oz)     BP Readings from Last 3 Encounters:   02/07/23 118/84   08/15/22 122/72   02/15/22 126/74       Lab Results   Component Value Date    WBC 6.00 02/06/2023    HGB 14.9 02/06/2023     HCT 45.5 02/06/2023     02/06/2023     02/06/2023    K 4.4 02/06/2023     02/06/2023    ALT 21 02/06/2023    AST 30 02/06/2023    CO2 30 02/06/2023    CREATININE 0.72 02/06/2023    BUN 13 02/06/2023     (H) 02/06/2023      Hemoglobin A1C   Date Value Ref Range Status   02/06/2023 5.2 0.0 - 5.6 % Final     Comment:     Reference Interval:  5.0 - 5.6 Normal   5.7 - 6.4 High Risk   > 6.5 Diabetic      Hgb A1c results are standardized based on the (NGSP) National   Glycohemoglobin Standardization Program.      Hemoglobin A1C levels are related to mean serum/plasma glucose   during the preceding 2-3 months.        08/10/2022 5.9 (H) 0.0 - 5.6 % Final     Comment:     Reference Interval:  5.0 - 5.6 Normal   5.7 - 6.4 High Risk   > 6.5 Diabetic      Hgb A1c results are standardized based on the (NGSP) National   Glycohemoglobin Standardization Program.      Hemoglobin A1C levels are related to mean serum/plasma glucose   during the preceding 2-3 months.        03/26/2022 5.7 (H) 0.0 - 5.6 % Final     Comment:     Reference Interval:  5.0 - 5.6 Normal   5.7 - 6.4 High Risk   > 6.5 Diabetic      Hgb A1c results are standardized based on the (NGSP) National   Glycohemoglobin Standardization Program.      Hemoglobin A1C levels are related to mean serum/plasma glucose   during the preceding 2-3 months.           Lab Results   Component Value Date    TSH <0.015 (L) 02/06/2023    TSH 0.906 08/10/2022    TSH 0.710 03/26/2022     Lab Results   Component Value Date    FREET4 1.17 02/06/2023    FREET4 1.07 08/10/2022    FREET4 1.19 03/26/2022     Lab Results   Component Value Date    LDLCALC 184.4 (H) 02/06/2023    LDLCALC 152.8 08/10/2022    LDLCALC 83.6 03/26/2022     Lab Results   Component Value Date    TRIG 193 (H) 02/06/2023    TRIG 176 (H) 08/10/2022    TRIG 182 (H) 03/26/2022            Physical Exam  Constitutional:       Appearance: Normal appearance.   HENT:      Head: Normocephalic and  atraumatic.   Eyes:      Extraocular Movements: Extraocular movements intact.      Pupils: Pupils are equal, round, and reactive to light.   Cardiovascular:      Rate and Rhythm: Normal rate.   Pulmonary:      Effort: Pulmonary effort is normal.   Neurological:      Mental Status: She is alert and oriented to person, place, and time.   Psychiatric:         Mood and Affect: Mood normal.       Medication List with Changes/Refills   Current Medications    ASCORBIC ACID (VITAMIN C ORAL)    Take by mouth.    BIOTIN ORAL    Take by mouth.    CALCIUM CARBONATE/VITAMIN D3 (VITAMIN D-3 ORAL)    Take by mouth.    CYANOCOBALAMIN, VITAMIN B-12, (VITAMIN B-12 ORAL)    Take by mouth.    FEXOFENADINE HCL (ALLEGRA ORAL)    Take by mouth.    LEVOTHYROXINE (SYNTHROID) 88 MCG TABLET    Take 1 tablet (88 mcg total) by mouth once daily.    ONDANSETRON (ZOFRAN-ODT) 4 MG TBDL    Take 1 tablet (4 mg total) by mouth every 6 (six) hours as needed (nausea).    SEMAGLUTIDE (OZEMPIC) 0.25 MG OR 0.5 MG(2 MG/1.5 ML) PEN INJECTOR    Inject 0.25 mg SQ weekly x 4 wks; then increase to 0.5 mg SQ weekly

## 2023-02-10 PROBLEM — R73.03 PRE-DIABETES: Status: ACTIVE | Noted: 2023-02-10

## 2023-02-10 RX ORDER — SEMAGLUTIDE 1.34 MG/ML
0.5 INJECTION, SOLUTION SUBCUTANEOUS
Qty: 2 PEN | Refills: 6 | Status: SHIPPED | OUTPATIENT
Start: 2023-02-10 | End: 2023-08-07

## 2023-02-10 RX ORDER — LEVOTHYROXINE SODIUM 88 UG/1
88 TABLET ORAL DAILY
Qty: 90 TABLET | Refills: 3 | Status: SHIPPED | OUTPATIENT
Start: 2023-02-10 | End: 2024-02-26 | Stop reason: SDUPTHER

## 2023-05-24 ENCOUNTER — PATIENT MESSAGE (OUTPATIENT)
Dept: FAMILY MEDICINE | Facility: CLINIC | Age: 58
End: 2023-05-24
Payer: COMMERCIAL

## 2023-07-26 DIAGNOSIS — Z12.31 OTHER SCREENING MAMMOGRAM: ICD-10-CM

## 2023-07-31 ENCOUNTER — PATIENT MESSAGE (OUTPATIENT)
Dept: ADMINISTRATIVE | Facility: HOSPITAL | Age: 58
End: 2023-07-31
Payer: COMMERCIAL

## 2023-08-07 ENCOUNTER — OFFICE VISIT (OUTPATIENT)
Dept: FAMILY MEDICINE | Facility: CLINIC | Age: 58
End: 2023-08-07
Payer: COMMERCIAL

## 2023-08-07 VITALS
HEIGHT: 65 IN | RESPIRATION RATE: 16 BRPM | OXYGEN SATURATION: 97 % | DIASTOLIC BLOOD PRESSURE: 58 MMHG | WEIGHT: 196.63 LBS | SYSTOLIC BLOOD PRESSURE: 130 MMHG | HEART RATE: 76 BPM | BODY MASS INDEX: 32.76 KG/M2

## 2023-08-07 DIAGNOSIS — G47.33 OSA (OBSTRUCTIVE SLEEP APNEA): ICD-10-CM

## 2023-08-07 DIAGNOSIS — E66.9 OBESITY (BMI 30-39.9): ICD-10-CM

## 2023-08-07 DIAGNOSIS — R73.03 PRE-DIABETES: ICD-10-CM

## 2023-08-07 DIAGNOSIS — Z00.00 WELLNESS EXAMINATION: ICD-10-CM

## 2023-08-07 DIAGNOSIS — E03.9 HYPOTHYROIDISM, UNSPECIFIED TYPE: ICD-10-CM

## 2023-08-07 DIAGNOSIS — E78.2 MIXED HYPERLIPIDEMIA: Primary | ICD-10-CM

## 2023-08-07 PROCEDURE — 3078F DIAST BP <80 MM HG: CPT | Mod: CPTII,S$GLB,, | Performed by: INTERNAL MEDICINE

## 2023-08-07 PROCEDURE — 1159F MED LIST DOCD IN RCRD: CPT | Mod: CPTII,S$GLB,, | Performed by: INTERNAL MEDICINE

## 2023-08-07 PROCEDURE — 99214 OFFICE O/P EST MOD 30 MIN: CPT | Mod: S$GLB,,, | Performed by: INTERNAL MEDICINE

## 2023-08-07 PROCEDURE — 3044F HG A1C LEVEL LT 7.0%: CPT | Mod: CPTII,S$GLB,, | Performed by: INTERNAL MEDICINE

## 2023-08-07 PROCEDURE — 3075F SYST BP GE 130 - 139MM HG: CPT | Mod: CPTII,S$GLB,, | Performed by: INTERNAL MEDICINE

## 2023-08-07 PROCEDURE — 99214 PR OFFICE/OUTPT VISIT, EST, LEVL IV, 30-39 MIN: ICD-10-PCS | Mod: S$GLB,,, | Performed by: INTERNAL MEDICINE

## 2023-08-07 PROCEDURE — 1160F PR REVIEW ALL MEDS BY PRESCRIBER/CLIN PHARMACIST DOCUMENTED: ICD-10-PCS | Mod: CPTII,S$GLB,, | Performed by: INTERNAL MEDICINE

## 2023-08-07 PROCEDURE — 3008F BODY MASS INDEX DOCD: CPT | Mod: CPTII,S$GLB,, | Performed by: INTERNAL MEDICINE

## 2023-08-07 PROCEDURE — 1159F PR MEDICATION LIST DOCUMENTED IN MEDICAL RECORD: ICD-10-PCS | Mod: CPTII,S$GLB,, | Performed by: INTERNAL MEDICINE

## 2023-08-07 PROCEDURE — 3008F PR BODY MASS INDEX (BMI) DOCUMENTED: ICD-10-PCS | Mod: CPTII,S$GLB,, | Performed by: INTERNAL MEDICINE

## 2023-08-07 PROCEDURE — 3044F PR MOST RECENT HEMOGLOBIN A1C LEVEL <7.0%: ICD-10-PCS | Mod: CPTII,S$GLB,, | Performed by: INTERNAL MEDICINE

## 2023-08-07 PROCEDURE — 3078F PR MOST RECENT DIASTOLIC BLOOD PRESSURE < 80 MM HG: ICD-10-PCS | Mod: CPTII,S$GLB,, | Performed by: INTERNAL MEDICINE

## 2023-08-07 PROCEDURE — 3075F PR MOST RECENT SYSTOLIC BLOOD PRESS GE 130-139MM HG: ICD-10-PCS | Mod: CPTII,S$GLB,, | Performed by: INTERNAL MEDICINE

## 2023-08-07 PROCEDURE — 99999 PR PBB SHADOW E&M-EST. PATIENT-LVL III: ICD-10-PCS | Mod: PBBFAC,,, | Performed by: INTERNAL MEDICINE

## 2023-08-07 PROCEDURE — 1160F RVW MEDS BY RX/DR IN RCRD: CPT | Mod: CPTII,S$GLB,, | Performed by: INTERNAL MEDICINE

## 2023-08-07 PROCEDURE — 99999 PR PBB SHADOW E&M-EST. PATIENT-LVL III: CPT | Mod: PBBFAC,,, | Performed by: INTERNAL MEDICINE

## 2023-08-07 RX ORDER — PHENTERMINE AND TOPIRAMATE 7.5; 46 MG/1; MG/1
1 CAPSULE, EXTENDED RELEASE ORAL DAILY
Qty: 30 CAPSULE | Refills: 4 | Status: SHIPPED | OUTPATIENT
Start: 2023-08-07 | End: 2023-09-06

## 2023-08-07 RX ORDER — PHENTERMINE AND TOPIRAMATE 3.75; 23 MG/1; MG/1
1 CAPSULE, EXTENDED RELEASE ORAL DAILY
Qty: 14 CAPSULE | Refills: 0 | Status: SHIPPED | OUTPATIENT
Start: 2023-08-07 | End: 2023-08-21

## 2023-08-07 RX ORDER — METFORMIN HYDROCHLORIDE 500 MG/1
500 TABLET, EXTENDED RELEASE ORAL
Qty: 90 TABLET | Refills: 2 | Status: SHIPPED | OUTPATIENT
Start: 2023-08-07 | End: 2024-02-26 | Stop reason: SDUPTHER

## 2023-08-07 NOTE — PROGRESS NOTES
Subjective:       Patient ID: Candice Cohn is a 58 y.o. female.    Chief Complaint: Follow-up and Results   Follow-up  Pertinent negatives include no chest pain or joint swelling.     Gyn: Dr Tian  MM2022   Dexa:  Never  Colonoscopy:  10/2021 cologaurd negative   Immunizations: Flu: D Tdap:  Not on old note due Pneumovax: next visit  Shingles: recommend  Covid: D (no vaccines on note)   Smoker:  Yes    Eye:   Note in        Hypothyroid:   Tsh <0.01 (Cut to 6 pills wkly)- TSH 1.6 will add 1/2 pill  all other days.  //   Rx Levo 88    TPO negative// ultrasound 2/15 negative.     Mixed hyperlipidemia: uncontrolled Tg 229, L 149.  Add fish oil and fiber supplement. R/c consider CT calcium    highest L 184     Glucose intolerance: A1c 5.2, 5.6 G 114. Frustrated eating well.  Will start Metformin   (hx of using Metformin) off $$ Ozempic      ALAN:  Dx 2019 AEH high 28.  Rx CPAP nightly pressure 5-20 auto PAP.  using greater than 4 hr. mgmt ENT Dr Graham      Metabolic syndrome: BMI 32 & 194, 31 & 189, 196.  Will restart   prev Qsymia  Goal 145-150  lbs           ____________________________________________________________________________________________________  Assessment & Plan:  1. Mixed hyperlipidemia  - Lipid Panel; Future    2. Pre-diabetes  - phentermine-topiramate (QSYMIA) 3.75-23 mg CM24; Take 1 tablet by mouth once daily. for 14 days  Dispense: 14 capsule; Refill: 0  - phentermine-topiramate (QSYMIA) 7.5-46 mg CM24; Take 1 tablet by mouth once daily.  Dispense: 30 capsule; Refill: 4  - metFORMIN (GLUCOPHAGE-XR) 500 MG ER 24hr tablet; Take 1 tablet (500 mg total) by mouth before evening meal.  Dispense: 90 tablet; Refill: 2  - Hemoglobin A1C; Future  - Comprehensive Metabolic Panel; Future    3. Hypothyroidism, unspecified type  - TSH; Future  - T4, Free; Future    4. Wellness examination  - Hemoglobin A1C; Future  - Comprehensive Metabolic Panel; Future  - Lipid Panel;  Future  - TSH; Future  - T4, Free; Future    5. Obesity (BMI 30-39.9)  - phentermine-topiramate (QSYMIA) 3.75-23 mg CM24; Take 1 tablet by mouth once daily. for 14 days  Dispense: 14 capsule; Refill: 0  - phentermine-topiramate (QSYMIA) 7.5-46 mg CM24; Take 1 tablet by mouth once daily.  Dispense: 30 capsule; Refill: 4     Mixed hyperlipidemia  -     Lipid Panel; Future; Expected date: 08/07/2023    Pre-diabetes  -     phentermine-topiramate (QSYMIA) 3.75-23 mg CM24; Take 1 tablet by mouth once daily. for 14 days  Dispense: 14 capsule; Refill: 0  -     phentermine-topiramate (QSYMIA) 7.5-46 mg CM24; Take 1 tablet by mouth once daily.  Dispense: 30 capsule; Refill: 4  -     metFORMIN (GLUCOPHAGE-XR) 500 MG ER 24hr tablet; Take 1 tablet (500 mg total) by mouth before evening meal.  Dispense: 90 tablet; Refill: 2  -     Hemoglobin A1C; Future; Expected date: 08/07/2023  -     Comprehensive Metabolic Panel; Future; Expected date: 08/07/2023    Hypothyroidism, unspecified type  -     TSH; Future; Expected date: 08/07/2023  -     T4, Free; Future; Expected date: 08/07/2023    Wellness examination  -     Hemoglobin A1C; Future; Expected date: 08/07/2023  -     Comprehensive Metabolic Panel; Future; Expected date: 08/07/2023  -     Lipid Panel; Future; Expected date: 08/07/2023  -     TSH; Future; Expected date: 08/07/2023  -     T4, Free; Future; Expected date: 08/07/2023    Obesity (BMI 30-39.9)  -     phentermine-topiramate (QSYMIA) 3.75-23 mg CM24; Take 1 tablet by mouth once daily. for 14 days  Dispense: 14 capsule; Refill: 0  -     phentermine-topiramate (QSYMIA) 7.5-46 mg CM24; Take 1 tablet by mouth once daily.  Dispense: 30 capsule; Refill: 4        Continue to work on regular exercise, maintain healthy weight, balanced diet. Avoid unhealthy habits: smoking, excessive alcohol intake.     Disclaimer: This note was partly generated using dictation software which may occasionally result in transcription  errors  ____________________________________________________________________________________________________  Review of Systems:  Review of Systems   Constitutional:  Negative for appetite change.   HENT:  Negative for nosebleeds.    Eyes:  Negative for pain.   Respiratory:  Negative for choking.    Cardiovascular:  Negative for chest pain.   Gastrointestinal:  Negative for blood in stool.   Genitourinary:  Negative for hematuria.   Musculoskeletal:  Negative for joint swelling.   Skin:  Negative for pallor.   Neurological:  Negative for facial asymmetry.   Hematological:  Does not bruise/bleed easily.   Psychiatric/Behavioral:  Negative for confusion.        Objective:     Wt Readings from Last 3 Encounters:   08/07/23 89.2 kg (196 lb 10.4 oz)   02/07/23 86 kg (189 lb 9.5 oz)   08/15/22 88.1 kg (194 lb 3.6 oz)     BP Readings from Last 3 Encounters:   08/07/23 (!) 130/58   02/07/23 118/84   08/15/22 122/72       Lab Results   Component Value Date    WBC 6.00 02/06/2023    HGB 14.9 02/06/2023    HCT 45.5 02/06/2023     02/06/2023     08/03/2023    K 4.3 08/03/2023     08/03/2023    ALT 28 08/03/2023    AST 32 08/03/2023    CO2 26 08/03/2023    CREATININE 0.64 08/03/2023    BUN 13 08/03/2023     (H) 08/03/2023      Hemoglobin A1C   Date Value Ref Range Status   08/03/2023 5.6 0.0 - 5.6 % Final     Comment:     Reference Interval:  5.0 - 5.6 Normal   5.7 - 6.4 High Risk   > 6.5 Diabetic      Hgb A1c results are standardized based on the (NGSP) National   Glycohemoglobin Standardization Program.      Hemoglobin A1C levels are related to mean serum/plasma glucose   during the preceding 2-3 months.        02/06/2023 5.2 0.0 - 5.6 % Final     Comment:     Reference Interval:  5.0 - 5.6 Normal   5.7 - 6.4 High Risk   > 6.5 Diabetic      Hgb A1c results are standardized based on the (NGSP) National   Glycohemoglobin Standardization Program.      Hemoglobin A1C levels are related to mean  serum/plasma glucose   during the preceding 2-3 months.        08/10/2022 5.9 (H) 0.0 - 5.6 % Final     Comment:     Reference Interval:  5.0 - 5.6 Normal   5.7 - 6.4 High Risk   > 6.5 Diabetic      Hgb A1c results are standardized based on the (NGSP) National   Glycohemoglobin Standardization Program.      Hemoglobin A1C levels are related to mean serum/plasma glucose   during the preceding 2-3 months.           Lab Results   Component Value Date    TSH 1.670 08/03/2023    TSH <0.015 (L) 02/06/2023    TSH 0.906 08/10/2022     Lab Results   Component Value Date    FREET4 1.06 08/03/2023    FREET4 1.17 02/06/2023    FREET4 1.07 08/10/2022     Lab Results   Component Value Date    LDLCALC 149.2 08/03/2023    LDLCALC 184.4 (H) 02/06/2023    LDLCALC 152.8 08/10/2022     Lab Results   Component Value Date    TRIG 229 (H) 08/03/2023    TRIG 193 (H) 02/06/2023    TRIG 176 (H) 08/10/2022            Physical Exam  Constitutional:       Appearance: Normal appearance.   HENT:      Head: Normocephalic and atraumatic.   Eyes:      Extraocular Movements: Extraocular movements intact.      Pupils: Pupils are equal, round, and reactive to light.   Cardiovascular:      Rate and Rhythm: Normal rate.   Pulmonary:      Effort: Pulmonary effort is normal.   Neurological:      Mental Status: She is alert and oriented to person, place, and time.   Psychiatric:         Mood and Affect: Mood normal.         Medication List with Changes/Refills   New Medications    METFORMIN (GLUCOPHAGE-XR) 500 MG ER 24HR TABLET    Take 1 tablet (500 mg total) by mouth before evening meal.    PHENTERMINE-TOPIRAMATE (QSYMIA) 3.75-23 MG CM24    Take 1 tablet by mouth once daily. for 14 days    PHENTERMINE-TOPIRAMATE (QSYMIA) 7.5-46 MG CM24    Take 1 tablet by mouth once daily.   Current Medications    BIOTIN ORAL    Take by mouth.    CALCIUM CARBONATE/VITAMIN D3 (VITAMIN D-3 ORAL)    Take by mouth.    CYANOCOBALAMIN, VITAMIN B-12, (VITAMIN B-12 ORAL)    Take  by mouth.    FEXOFENADINE HCL (ALLEGRA ORAL)    Take by mouth.    LEVOTHYROXINE (SYNTHROID) 88 MCG TABLET    Take 1 tablet (88 mcg total) by mouth once daily.    ONDANSETRON (ZOFRAN-ODT) 4 MG TBDL    Take 1 tablet (4 mg total) by mouth every 6 (six) hours as needed (nausea).   Discontinued Medications    ASCORBIC ACID (VITAMIN C ORAL)    Take by mouth.    SEMAGLUTIDE (OZEMPIC) 0.25 MG OR 0.5 MG(2 MG/1.5 ML) PEN INJECTOR    Inject 0.5 mg into the skin every 7 days.

## 2023-12-01 ENCOUNTER — TELEPHONE (OUTPATIENT)
Dept: FAMILY MEDICINE | Facility: CLINIC | Age: 58
End: 2023-12-01
Payer: COMMERCIAL

## 2024-02-26 ENCOUNTER — OFFICE VISIT (OUTPATIENT)
Dept: FAMILY MEDICINE | Facility: CLINIC | Age: 59
End: 2024-02-26
Payer: COMMERCIAL

## 2024-02-26 VITALS
BODY MASS INDEX: 30.44 KG/M2 | OXYGEN SATURATION: 97 % | WEIGHT: 189.38 LBS | SYSTOLIC BLOOD PRESSURE: 138 MMHG | HEART RATE: 77 BPM | DIASTOLIC BLOOD PRESSURE: 72 MMHG | HEIGHT: 66 IN

## 2024-02-26 DIAGNOSIS — E66.9 OBESITY (BMI 30-39.9): ICD-10-CM

## 2024-02-26 DIAGNOSIS — E03.9 HYPOTHYROIDISM, UNSPECIFIED TYPE: ICD-10-CM

## 2024-02-26 DIAGNOSIS — Z00.00 WELLNESS EXAMINATION: Primary | ICD-10-CM

## 2024-02-26 DIAGNOSIS — E78.2 MIXED HYPERLIPIDEMIA: ICD-10-CM

## 2024-02-26 DIAGNOSIS — R73.03 PRE-DIABETES: ICD-10-CM

## 2024-02-26 PROCEDURE — 1159F MED LIST DOCD IN RCRD: CPT | Mod: CPTII,S$GLB,, | Performed by: INTERNAL MEDICINE

## 2024-02-26 PROCEDURE — 3008F BODY MASS INDEX DOCD: CPT | Mod: CPTII,S$GLB,, | Performed by: INTERNAL MEDICINE

## 2024-02-26 PROCEDURE — 99999 PR PBB SHADOW E&M-EST. PATIENT-LVL III: CPT | Mod: PBBFAC,,, | Performed by: INTERNAL MEDICINE

## 2024-02-26 PROCEDURE — 1160F RVW MEDS BY RX/DR IN RCRD: CPT | Mod: CPTII,S$GLB,, | Performed by: INTERNAL MEDICINE

## 2024-02-26 PROCEDURE — 3044F HG A1C LEVEL LT 7.0%: CPT | Mod: CPTII,S$GLB,, | Performed by: INTERNAL MEDICINE

## 2024-02-26 PROCEDURE — 99396 PREV VISIT EST AGE 40-64: CPT | Mod: S$GLB,,, | Performed by: INTERNAL MEDICINE

## 2024-02-26 PROCEDURE — 3078F DIAST BP <80 MM HG: CPT | Mod: CPTII,S$GLB,, | Performed by: INTERNAL MEDICINE

## 2024-02-26 PROCEDURE — 3075F SYST BP GE 130 - 139MM HG: CPT | Mod: CPTII,S$GLB,, | Performed by: INTERNAL MEDICINE

## 2024-02-26 RX ORDER — METFORMIN HYDROCHLORIDE 500 MG/1
500 TABLET, EXTENDED RELEASE ORAL
Qty: 90 TABLET | Refills: 1 | Status: SHIPPED | OUTPATIENT
Start: 2024-02-26 | End: 2025-02-25

## 2024-02-26 RX ORDER — LEVOTHYROXINE SODIUM 88 UG/1
88 TABLET ORAL DAILY
Qty: 90 TABLET | Refills: 3 | Status: SHIPPED | OUTPATIENT
Start: 2024-02-26 | End: 2024-04-01

## 2024-02-26 RX ORDER — TIRZEPATIDE 5 MG/.5ML
5 INJECTION, SOLUTION SUBCUTANEOUS
Qty: 4 PEN | Refills: 1 | Status: SHIPPED | OUTPATIENT
Start: 2024-02-26

## 2024-02-26 NOTE — PROGRESS NOTES
Subjective:       Patient ID: Candice Cohn is a 59 y.o. female.    Chief Complaint: Annual Exam   Follow-up  Pertinent negatives include no chest pain or joint swelling.       Gyn: Dr Tian  MM2023  Dexa:  Never  Colonoscopy:  10/2021 cologaurd negative   Immunizations: Flu: D Tdap:  Not on old note due Shingles: recommend  Covid: D (no vaccines on note)   Smoker:  Yes    Eye:   Note in      Wellness      Hypothyroid:  controlled //   Rx Levo 88  1/2 pill  all other days.  TPO negative// US 2/15 negative.     Mixed hyperlipidemia: uncontrolled Tg 229, L 149- 123.   Add fish oil and fiber supplement.   R/c consider CT calcium  //             highest L 184     Glucose intolerance: A1c 5.2, 5.6, 5.4  / G 114 //   Rx compounded Mounjaro 5, not taking Metformin ER recommend start.     Generic Mounjaro 5 (started 2024)   off $$ Ozempic didn't work as well     ALAN:  Dx 2019 AEH high 28.  Rx CPAP nightly 5-20 auto PAP.  mgmt ENT Dr Graham      Metabolic syndrome: BMI 32 & 194, 31 & 189, 196, 30/189    prev Qsymia  Goal 145-150  lbs      ____________________________________________________________________________________________________  Assessment & Plan:  1. Wellness examination  - Comprehensive Metabolic Panel; Future  - Hemoglobin A1C; Future  - Lipid Panel; Future  - CBC Without Differential; Future  - TSH; Future  - T4, Free; Future    2. Pre-diabetes  - tirzepatide (MOUNJARO) 5 mg/0.5 mL PnIj; Inject 5 mg into the skin every 7 days.  Dispense: 4 Pen; Refill: 1  - metFORMIN (GLUCOPHAGE-XR) 500 MG ER 24hr tablet; Take 1 tablet (500 mg total) by mouth before evening meal.  Dispense: 90 tablet; Refill: 1  - Comprehensive Metabolic Panel; Future  - Hemoglobin A1C; Future    3. Mixed hyperlipidemia  - tirzepatide (MOUNJARO) 5 mg/0.5 mL PnIj; Inject 5 mg into the skin every 7 days.  Dispense: 4 Pen; Refill: 1  - Lipid Panel; Future    4. Hypothyroidism, unspecified type  - levothyroxine  (SYNTHROID) 88 MCG tablet; Take 1 tablet (88 mcg total) by mouth once daily.  Dispense: 90 tablet; Refill: 3  - TSH; Future  - T4, Free; Future    5. Obesity (BMI 30-39.9)  - tirzepatide (MOUNJARO) 5 mg/0.5 mL PnIj; Inject 5 mg into the skin every 7 days.  Dispense: 4 Pen; Refill: 1     Wellness examination  -     Comprehensive Metabolic Panel; Future; Expected date: 02/26/2024  -     Hemoglobin A1C; Future; Expected date: 02/26/2024  -     Lipid Panel; Future; Expected date: 02/26/2024  -     CBC Without Differential; Future; Expected date: 02/26/2024  -     TSH; Future; Expected date: 02/26/2024  -     T4, Free; Future; Expected date: 02/26/2024    Pre-diabetes  Comments:  ? Mounjaro covered w other dx w weight, mix HLD  Orders:  -     tirzepatide (MOUNJARO) 5 mg/0.5 mL PnIj; Inject 5 mg into the skin every 7 days.  Dispense: 4 Pen; Refill: 1  -     metFORMIN (GLUCOPHAGE-XR) 500 MG ER 24hr tablet; Take 1 tablet (500 mg total) by mouth before evening meal.  Dispense: 90 tablet; Refill: 1  -     Comprehensive Metabolic Panel; Future; Expected date: 02/26/2024  -     Hemoglobin A1C; Future; Expected date: 02/26/2024    Mixed hyperlipidemia  -     tirzepatide (MOUNJARO) 5 mg/0.5 mL PnIj; Inject 5 mg into the skin every 7 days.  Dispense: 4 Pen; Refill: 1  -     Lipid Panel; Future; Expected date: 02/26/2024    Hypothyroidism, unspecified type  -     levothyroxine (SYNTHROID) 88 MCG tablet; Take 1 tablet (88 mcg total) by mouth once daily.  Dispense: 90 tablet; Refill: 3  -     TSH; Future; Expected date: 02/26/2024  -     T4, Free; Future; Expected date: 02/26/2024    Obesity (BMI 30-39.9)  -     tirzepatide (MOUNJARO) 5 mg/0.5 mL PnIj; Inject 5 mg into the skin every 7 days.  Dispense: 4 Pen; Refill: 1        Continue to work on regular exercise, maintain healthy weight, balanced diet. Avoid unhealthy habits: smoking, excessive alcohol intake.     Disclaimer: This note was partly generated using dictation software  which may occasionally result in transcription errors  ____________________________________________________________________________________________________  Review of Systems:  Review of Systems   Constitutional:  Negative for appetite change.   HENT:  Negative for nosebleeds.    Eyes:  Negative for pain.   Respiratory:  Negative for choking.    Cardiovascular:  Negative for chest pain.   Gastrointestinal:  Negative for blood in stool.   Genitourinary:  Negative for hematuria.   Musculoskeletal:  Negative for joint swelling.   Skin:  Negative for pallor.   Neurological:  Negative for facial asymmetry.   Hematological:  Does not bruise/bleed easily.   Psychiatric/Behavioral:  Negative for confusion.        Objective:     Wt Readings from Last 3 Encounters:   02/26/24 85.9 kg (189 lb 6 oz)   10/05/23 88 kg (194 lb)   08/17/23 88 kg (194 lb)     BP Readings from Last 3 Encounters:   02/26/24 138/72   08/07/23 (!) 130/58   02/07/23 118/84       Lab Results   Component Value Date    WBC 6.00 02/06/2023    HGB 14.9 02/06/2023    HCT 45.5 02/06/2023     02/06/2023     02/24/2024    K 4.0 02/24/2024     02/24/2024    ALT 27 02/24/2024    AST 32 02/24/2024    CO2 26 02/24/2024    CREATININE 0.75 02/24/2024    BUN 14 02/24/2024     (H) 02/24/2024      Hemoglobin A1C   Date Value Ref Range Status   02/24/2024 5.4 0.0 - 5.6 % Final     Comment:     Reference Interval:  5.0 - 5.6 Normal   5.7 - 6.4 High Risk   > 6.5 Diabetic      Hgb A1c results are standardized based on the (NGSP) National   Glycohemoglobin Standardization Program.      Hemoglobin A1C levels are related to mean serum/plasma glucose   during the preceding 2-3 months.        08/03/2023 5.6 0.0 - 5.6 % Final     Comment:     Reference Interval:  5.0 - 5.6 Normal   5.7 - 6.4 High Risk   > 6.5 Diabetic      Hgb A1c results are standardized based on the (NGSP) National   Glycohemoglobin Standardization Program.      Hemoglobin A1C levels are  related to mean serum/plasma glucose   during the preceding 2-3 months.        02/06/2023 5.2 0.0 - 5.6 % Final     Comment:     Reference Interval:  5.0 - 5.6 Normal   5.7 - 6.4 High Risk   > 6.5 Diabetic      Hgb A1c results are standardized based on the (NGSP) National   Glycohemoglobin Standardization Program.      Hemoglobin A1C levels are related to mean serum/plasma glucose   during the preceding 2-3 months.           Lab Results   Component Value Date    TSH 1.600 02/24/2024    TSH 1.670 08/03/2023    TSH <0.015 (L) 02/06/2023     Lab Results   Component Value Date    FREET4 1.15 02/24/2024    FREET4 1.06 08/03/2023    FREET4 1.17 02/06/2023     Lab Results   Component Value Date    LDLCALC 123.2 02/24/2024    LDLCALC 149.2 08/03/2023    LDLCALC 184.4 (H) 02/06/2023     Lab Results   Component Value Date    TRIG 214 (H) 02/24/2024    TRIG 229 (H) 08/03/2023    TRIG 193 (H) 02/06/2023            Physical Exam  Constitutional:       Appearance: Normal appearance.   HENT:      Head: Normocephalic and atraumatic.   Eyes:      Extraocular Movements: Extraocular movements intact.      Pupils: Pupils are equal, round, and reactive to light.   Cardiovascular:      Rate and Rhythm: Normal rate.   Pulmonary:      Effort: Pulmonary effort is normal.   Neurological:      Mental Status: She is alert and oriented to person, place, and time.   Psychiatric:         Mood and Affect: Mood normal.         Medication List with Changes/Refills   New Medications    TIRZEPATIDE (MOUNJARO) 5 MG/0.5 ML PNIJ    Inject 5 mg into the skin every 7 days.   Current Medications    BIOTIN ORAL    Take by mouth.    CALCIUM CARBONATE/VITAMIN D3 (VITAMIN D-3 ORAL)    Take by mouth.    CYANOCOBALAMIN, VITAMIN B-12, (VITAMIN B-12 ORAL)    Take by mouth.    FEXOFENADINE HCL (ALLEGRA ORAL)    Take by mouth.    OMEGA-3 FATTY ACIDS/FISH OIL (FISH OIL-OMEGA-3 FATTY ACIDS) 300-1,000 MG CAPSULE    Take by mouth once daily.    ONDANSETRON (ZOFRAN-ODT) 4  MG TBDL    Take 1 tablet (4 mg total) by mouth every 6 (six) hours as needed (nausea).   Changed and/or Refilled Medications    Modified Medication Previous Medication    LEVOTHYROXINE (SYNTHROID) 88 MCG TABLET levothyroxine (SYNTHROID) 88 MCG tablet       Take 1 tablet (88 mcg total) by mouth once daily.    Take 1 tablet (88 mcg total) by mouth once daily.    METFORMIN (GLUCOPHAGE-XR) 500 MG ER 24HR TABLET metFORMIN (GLUCOPHAGE-XR) 500 MG ER 24hr tablet       Take 1 tablet (500 mg total) by mouth before evening meal.    Take 1 tablet (500 mg total) by mouth before evening meal.

## 2024-04-01 DIAGNOSIS — E03.9 HYPOTHYROIDISM, UNSPECIFIED TYPE: ICD-10-CM

## 2024-04-01 RX ORDER — LEVOTHYROXINE SODIUM 88 UG/1
88 TABLET ORAL
Qty: 90 TABLET | Refills: 3 | Status: SHIPPED | OUTPATIENT
Start: 2024-04-01

## 2024-04-01 NOTE — TELEPHONE ENCOUNTER
Refill Decision Note   Candiceedmond Cohn  is requesting a refill authorization.  Brief Assessment and Rationale for Refill:  Approve     Medication Therapy Plan:         Comments:     Note composed:6:19 PM 04/01/2024

## 2024-04-01 NOTE — TELEPHONE ENCOUNTER
No care due was identified.  [facility] Embedded Care Due Messages. Reference number: [id number].   4/01/2024 12:31:00 AM CDT

## 2024-04-16 ENCOUNTER — TELEPHONE (OUTPATIENT)
Dept: PHARMACY | Facility: CLINIC | Age: 59
End: 2024-04-16
Payer: COMMERCIAL

## 2024-08-23 ENCOUNTER — PATIENT OUTREACH (OUTPATIENT)
Dept: ADMINISTRATIVE | Facility: HOSPITAL | Age: 59
End: 2024-08-23
Payer: COMMERCIAL

## 2024-08-23 ENCOUNTER — PATIENT MESSAGE (OUTPATIENT)
Dept: ADMINISTRATIVE | Facility: HOSPITAL | Age: 59
End: 2024-08-23
Payer: COMMERCIAL

## 2024-08-23 NOTE — PROGRESS NOTES
Population Health Chart Review & Patient Outreach Details      Additional [facility] Health Notes:               Updates Requested / Reviewed:      Updated Care Coordination Note, , and Immunizations Reconciliation Completed or Queried: [place]         Health Maintenance Topics Overdue:      [facility] Score: 0     Patient is not due for any topics at this time.                       Health Maintenance Topic(s) Outreach Outcomes & Actions Taken:    Breast Cancer Screening - Outreach Outcomes & Actions Taken  : Mammogram scheduled

## 2024-08-26 ENCOUNTER — OFFICE VISIT (OUTPATIENT)
Dept: FAMILY MEDICINE | Facility: CLINIC | Age: 59
End: 2024-08-26
Payer: COMMERCIAL

## 2024-08-26 VITALS
HEIGHT: 66 IN | HEART RATE: 71 BPM | RESPIRATION RATE: 18 BRPM | BODY MASS INDEX: 25.86 KG/M2 | WEIGHT: 160.94 LBS | DIASTOLIC BLOOD PRESSURE: 64 MMHG | SYSTOLIC BLOOD PRESSURE: 104 MMHG

## 2024-08-26 DIAGNOSIS — G47.33 OSA (OBSTRUCTIVE SLEEP APNEA): ICD-10-CM

## 2024-08-26 DIAGNOSIS — E78.2 MIXED HYPERLIPIDEMIA: Primary | ICD-10-CM

## 2024-08-26 DIAGNOSIS — Z13.820 ENCOUNTER FOR SCREENING FOR OSTEOPOROSIS: ICD-10-CM

## 2024-08-26 DIAGNOSIS — E66.3 OVERWEIGHT (BMI 25.0-29.9): ICD-10-CM

## 2024-08-26 DIAGNOSIS — Z00.00 WELLNESS EXAMINATION: ICD-10-CM

## 2024-08-26 DIAGNOSIS — R73.02 GLUCOSE INTOLERANCE (IMPAIRED GLUCOSE TOLERANCE): ICD-10-CM

## 2024-08-26 DIAGNOSIS — F17.210 CIGARETTE SMOKER: ICD-10-CM

## 2024-08-26 DIAGNOSIS — Z12.2 ENCOUNTER FOR SCREENING FOR LUNG CANCER: ICD-10-CM

## 2024-08-26 DIAGNOSIS — E03.9 HYPOTHYROIDISM, UNSPECIFIED TYPE: ICD-10-CM

## 2024-08-26 DIAGNOSIS — Z78.0 MENOPAUSE: ICD-10-CM

## 2024-08-26 DIAGNOSIS — Z13.6 ENCOUNTER FOR SCREENING FOR CARDIOVASCULAR DISORDERS: ICD-10-CM

## 2024-08-26 PROCEDURE — 99999 PR PBB SHADOW E&M-EST. PATIENT-LVL IV: CPT | Mod: PBBFAC,,, | Performed by: INTERNAL MEDICINE

## 2024-08-26 PROCEDURE — 1160F RVW MEDS BY RX/DR IN RCRD: CPT | Mod: CPTII,S$GLB,, | Performed by: INTERNAL MEDICINE

## 2024-08-26 PROCEDURE — 3074F SYST BP LT 130 MM HG: CPT | Mod: CPTII,S$GLB,, | Performed by: INTERNAL MEDICINE

## 2024-08-26 PROCEDURE — 3008F BODY MASS INDEX DOCD: CPT | Mod: CPTII,S$GLB,, | Performed by: INTERNAL MEDICINE

## 2024-08-26 PROCEDURE — 1159F MED LIST DOCD IN RCRD: CPT | Mod: CPTII,S$GLB,, | Performed by: INTERNAL MEDICINE

## 2024-08-26 PROCEDURE — 99214 OFFICE O/P EST MOD 30 MIN: CPT | Mod: S$GLB,,, | Performed by: INTERNAL MEDICINE

## 2024-08-26 PROCEDURE — 3044F HG A1C LEVEL LT 7.0%: CPT | Mod: CPTII,S$GLB,, | Performed by: INTERNAL MEDICINE

## 2024-08-26 PROCEDURE — 3078F DIAST BP <80 MM HG: CPT | Mod: CPTII,S$GLB,, | Performed by: INTERNAL MEDICINE

## 2024-08-26 NOTE — PROGRESS NOTES
Subjective:       Patient ID: Candice Cohn is a 59 y.o. female.    Chief Complaint: Follow-up   HPI    Gyn: Ablation no menses / Dr Tian NP Sol  MM2023  Dexa:  Never  Colonoscopy:  10/2021 cologaurd negative   Immunizations: Flu: D Tdap:  Not on old note due Shingles: recommend    Smoker:  Yes  cessation discussed/ CT lung screen order  Note in      Wellness      ? elevated TSH due to taking Biotin supplement.  Hold supplement 1 week before next lab  Hypothyroid:  uncontrolled //   Rx Levo 88  1/2 pill  1 all other days.  TPO negative// US 2/15 negative.     Mixed hyperlipidemia: uncontrolled Tg 174-229, L 123-184.   Rxfish oil-- add fiber supplement.   R/c consider CT calcium  //             highest L 184     Glucose intolerance:  Improved with compounded GLP 1.  A1c 5.0  / G 100 //   Rx Metformin , compounded Mounjaro 5.                off $$ Ozempic didn't work as well     ALAN:  Dx 2019 / AEH high 28.  Rx CPAP 5-20 auto PAP.  mgmt ENT Dr Graham      Metabolic syndrome: BMI 32/194, 31/189, 30/189, 25/160   prev Qsymia  Goal 145-150  lbs     The 10-year ASCVD risk score (Margarita DK, et al., 2019) is: 5.7%    Values used to calculate the score:      Age: 59 years      Sex: Female      Is Non- : No      Diabetic: No      Tobacco smoker: Yes      Systolic Blood Pressure: 104 mmHg      Is BP treated: No      HDL Cholesterol: 48 mg/dL      Total Cholesterol: 248 mg/dL        ____________________________________________________________________________________________________  Assessment & Plan:  1. Mixed hyperlipidemia  - Lipid Panel; Future    2. Glucose intolerance (impaired glucose tolerance)  - Comprehensive Metabolic Panel; Future  - Hemoglobin A1C; Future    3. Hypothyroidism, unspecified type  - TSH; Future  - T4, Free; Future    4. Encounter for screening for cardiovascular disorders    5. Cigarette smoker  - CT Chest Lung Screening Low Dose;  Future    6. Menopause  - DEXA Bone Density Axial Skeleton 1 or more Site W TBS XPD; Future    7. Encounter for screening for osteoporosis  - DEXA Bone Density Axial Skeleton 1 or more Site W TBS XPD; Future    8. Wellness examination  - DEXA Bone Density Axial Skeleton 1 or more Site W TBS XPD; Future  - Comprehensive Metabolic Panel; Future  - Lipid Panel; Future  - Hemoglobin A1C; Future  - TSH; Future  - T4, Free; Future    9. Encounter for screening for lung cancer  - CT Chest Lung Screening Low Dose; Future    10. ALAN (obstructive sleep apnea)    11. Overweight (BMI 25.0-29.9)     Mixed hyperlipidemia  -     Cancel: CT Cardiac Scoring; Future; Expected date: 08/26/2024  -     Lipid Panel; Future; Expected date: 08/26/2024    Glucose intolerance (impaired glucose tolerance)  -     Comprehensive Metabolic Panel; Future; Expected date: 08/26/2024  -     Hemoglobin A1C; Future; Expected date: 08/26/2024    Hypothyroidism, unspecified type  -     TSH; Future; Expected date: 08/26/2024  -     T4, Free; Future; Expected date: 08/26/2024    Encounter for screening for cardiovascular disorders  -     Cancel: CT Cardiac Scoring; Future; Expected date: 08/26/2024    Cigarette smoker  -     Cancel: CT Cardiac Scoring; Future; Expected date: 08/26/2024  -     CT Chest Lung Screening Low Dose; Future; Expected date: 08/26/2024    Menopause  -     DEXA Bone Density Axial Skeleton 1 or more Site W TBS XPD; Future; Expected date: 08/26/2024    Encounter for screening for osteoporosis  -     DEXA Bone Density Axial Skeleton 1 or more Site W TBS XPD; Future; Expected date: 08/26/2024    Wellness examination  -     DEXA Bone Density Axial Skeleton 1 or more Site W TBS XPD; Future; Expected date: 08/26/2024  -     Comprehensive Metabolic Panel; Future; Expected date: 08/26/2024  -     Lipid Panel; Future; Expected date: 08/26/2024  -     Hemoglobin A1C; Future; Expected date: 08/26/2024  -     TSH; Future; Expected date:  08/26/2024  -     T4, Free; Future; Expected date: 08/26/2024    Encounter for screening for lung cancer  -     CT Chest Lung Screening Low Dose; Future; Expected date: 08/26/2024    ALAN (obstructive sleep apnea)    Overweight (BMI 25.0-29.9)        Continue to work on maintain healthy weight, balanced diet. Avoid unhealthy habits: smoking/vaping, excessive alcohol intake.     Recommend diet exercise:  High protein, low fat, low carb diet - calories women under <1200 & men <1800, carbs <100 G, protein 50-60 G daily. Protein supplements to replace one meal.  Keep all beverages < 10 calories per serving. Keep snacks < 100 calories.   Recommend exercise 160 minutes per week, combo of cardio and weight/strength training.     Disclaimer: This note was partly generated using dictation software which may occasionally result in transcription errors  ____________________________________________________________________________________________________  Review of Systems:  Review of Systems      Negative     Objective:     Wt Readings from Last 3 Encounters:   08/26/24 73 kg (160 lb 15 oz)   02/26/24 85.9 kg (189 lb 6 oz)   10/05/23 88 kg (194 lb)     BP Readings from Last 3 Encounters:   08/26/24 104/64   02/26/24 138/72   08/07/23 (!) 130/58       Lab Results   Component Value Date    WBC 6.92 08/23/2024    HGB 15.0 08/23/2024    HCT 47.8 08/23/2024     08/23/2024     08/23/2024    K 4.1 08/23/2024     08/23/2024    ALT 14 08/23/2024    AST 25 08/23/2024    CO2 27 08/23/2024    CREATININE 0.71 08/23/2024    BUN 12 08/23/2024     08/23/2024      Hemoglobin A1C   Date Value Ref Range Status   08/23/2024 5.0 0.0 - 5.6 % Final     Comment:     Reference Interval:  5.0 - 5.6 Normal   5.7 - 6.4 High Risk   > 6.5 Diabetic      Hgb A1c results are standardized based on the (NGSP) National   Glycohemoglobin Standardization Program.      Hemoglobin A1C levels are related to mean serum/plasma glucose   during  the preceding 2-3 months.        02/24/2024 5.4 0.0 - 5.6 % Final     Comment:     Reference Interval:  5.0 - 5.6 Normal   5.7 - 6.4 High Risk   > 6.5 Diabetic      Hgb A1c results are standardized based on the (NGSP) National   Glycohemoglobin Standardization Program.      Hemoglobin A1C levels are related to mean serum/plasma glucose   during the preceding 2-3 months.        08/03/2023 5.6 0.0 - 5.6 % Final     Comment:     Reference Interval:  5.0 - 5.6 Normal   5.7 - 6.4 High Risk   > 6.5 Diabetic      Hgb A1c results are standardized based on the (NGSP) National   Glycohemoglobin Standardization Program.      Hemoglobin A1C levels are related to mean serum/plasma glucose   during the preceding 2-3 months.           Lab Results   Component Value Date    TSH 4.480 (H) 08/23/2024    TSH 1.600 02/24/2024    TSH 1.670 08/03/2023     Lab Results   Component Value Date    FREET4 1.19 08/23/2024    FREET4 1.15 02/24/2024    FREET4 1.06 08/03/2023     Lab Results   Component Value Date    LDLCALC 165.2 (H) 08/23/2024    LDLCALC 123.2 02/24/2024    LDLCALC 149.2 08/03/2023     Lab Results   Component Value Date    TRIG 174 (H) 08/23/2024    TRIG 214 (H) 02/24/2024    TRIG 229 (H) 08/03/2023            Physical Exam      Constitutional:       Appearance: Normal appearance.   HENT:      Head: Normocephalic and atraumatic.   Eyes:      Extraocular Movements: Extraocular movements intact.      Pupils: Pupils are equal, round, and reactive to light.   Cardiovascular:      Rate and Rhythm: Normal rate.   Pulmonary:      Effort: Pulmonary effort is normal.   Neurological:      Mental Status: She is alert and oriented to person, place, and time.   Psychiatric:         Mood and Affect: Mood normal.     Medication List with Changes/Refills   Current Medications    BIOTIN ORAL    Take by mouth.    CALCIUM CARBONATE/VITAMIN D3 (VITAMIN D-3 ORAL)    Take by mouth.    CYANOCOBALAMIN, VITAMIN B-12, (VITAMIN B-12 ORAL)    Take by mouth.     FEXOFENADINE HCL (ALLEGRA ORAL)    Take by mouth.    LEVOTHYROXINE (SYNTHROID) 88 MCG TABLET    TAKE 1 TABLET BY MOUTH EVERY DAY    METFORMIN (GLUCOPHAGE-XR) 500 MG ER 24HR TABLET    Take 1 tablet (500 mg total) by mouth before evening meal.    OMEGA-3 FATTY ACIDS/FISH OIL (FISH OIL-OMEGA-3 FATTY ACIDS) 300-1,000 MG CAPSULE    Take by mouth once daily.    TIRZEPATIDE 7.5 MG/0.5 ML PNIJ    Inject 7.5 mg into the skin every 7 days.   Discontinued Medications    ONDANSETRON (ZOFRAN-ODT) 4 MG TBDL    Take 1 tablet (4 mg total) by mouth every 6 (six) hours as needed (nausea).    TIRZEPATIDE (MOUNJARO) 5 MG/0.5 ML PNIJ    Inject 5 mg into the skin every 7 days.

## 2024-09-18 ENCOUNTER — HOSPITAL ENCOUNTER (OUTPATIENT)
Dept: RADIOLOGY | Facility: HOSPITAL | Age: 59
Discharge: HOME | End: 2024-09-18
Attending: INTERNAL MEDICINE
Payer: COMMERCIAL

## 2024-09-18 DIAGNOSIS — Z00.00 WELLNESS EXAMINATION: ICD-10-CM

## 2024-09-18 DIAGNOSIS — Z12.2 ENCOUNTER FOR SCREENING FOR LUNG CANCER: ICD-10-CM

## 2024-09-18 DIAGNOSIS — F17.210 CIGARETTE SMOKER: ICD-10-CM

## 2024-09-18 DIAGNOSIS — Z78.0 MENOPAUSE: ICD-10-CM

## 2024-09-18 DIAGNOSIS — Z13.820 ENCOUNTER FOR SCREENING FOR OSTEOPOROSIS: ICD-10-CM

## 2024-09-18 PROCEDURE — 77091 TBS TECHL CALCULATION ONLY: CPT | Mod: PO

## 2024-09-18 PROCEDURE — 71271 CT THORAX LUNG CANCER SCR C-: CPT | Mod: TC,PO

## 2024-09-18 PROCEDURE — 77092 TBS I&R FX RSK QHP: CPT | Mod: ,,, | Performed by: RADIOLOGY

## 2024-09-18 PROCEDURE — 77080 DXA BONE DENSITY AXIAL: CPT | Mod: TC,PO

## 2024-09-18 PROCEDURE — 77080 DXA BONE DENSITY AXIAL: CPT | Mod: 26,,, | Performed by: RADIOLOGY

## 2024-09-18 PROCEDURE — 71271 CT THORAX LUNG CANCER SCR C-: CPT | Mod: 26,,, | Performed by: STUDENT IN AN ORGANIZED HEALTH CARE EDUCATION/TRAINING PROGRAM

## 2024-10-04 DIAGNOSIS — R73.03 PRE-DIABETES: ICD-10-CM

## 2024-10-04 RX ORDER — METFORMIN HYDROCHLORIDE 500 MG/1
500 TABLET, EXTENDED RELEASE ORAL
Qty: 90 TABLET | Refills: 1 | Status: SHIPPED | OUTPATIENT
Start: 2024-10-04 | End: 2025-10-04

## 2024-10-04 NOTE — TELEPHONE ENCOUNTER
No care due was identified.  [facility] Embedded Care Due Messages. Reference number: [id number].   10/04/2024 12:29:53 AM CDT

## 2024-10-04 NOTE — TELEPHONE ENCOUNTER
Refill Decision Note   Candice Suki  is requesting a refill authorization.  Brief Assessment and Rationale for Refill:  Approve     Medication Therapy Plan:         Comments:     Note composed:5:12 AM 10/04/2024

## 2025-02-11 ENCOUNTER — TELEPHONE (OUTPATIENT)
Dept: PHARMACY | Facility: CLINIC | Age: 60
End: 2025-02-11
Payer: COMMERCIAL

## 2025-02-11 NOTE — TELEPHONE ENCOUNTER
[facility]/Population Health Chart Review & Patient Outreach Details For Medication Adherence Project    Reason for Outreach Encounter: 3rd Party payor non-compliance report ([employer], [employer], C, etc)  2.  Patient Outreach Method: Reviewed patient chart  and [employer] message  3.   Medication in question:    Diabetes Medications               metFORMIN (GLUCOPHAGE-XR) 500 MG ER 24hr tablet TAKE 1 TABLET (500 MG TOTAL) BY MOUTH BEFORE EVENING MEAL.    tirzepatide 7.5 mg/0.5 mL PnIj Inject 7.5 mg into the skin every 7 days.                 metformin  last filled  7/5 for 90 day supply      4.  Reviewed and or Updates Made To: Patient Chart  5. Outreach Outcomes and/or actions taken: Sent inquiry to patient: Waiting for response  Additional Notes:

## 2025-02-26 ENCOUNTER — OFFICE VISIT (OUTPATIENT)
Dept: FAMILY MEDICINE | Facility: CLINIC | Age: 60
End: 2025-02-26
Payer: COMMERCIAL

## 2025-02-26 VITALS
HEIGHT: 66 IN | WEIGHT: 157.94 LBS | BODY MASS INDEX: 25.38 KG/M2 | HEART RATE: 67 BPM | OXYGEN SATURATION: 98 % | DIASTOLIC BLOOD PRESSURE: 68 MMHG | RESPIRATION RATE: 18 BRPM | SYSTOLIC BLOOD PRESSURE: 124 MMHG

## 2025-02-26 DIAGNOSIS — E03.9 HYPOTHYROIDISM, UNSPECIFIED TYPE: Primary | ICD-10-CM

## 2025-02-26 DIAGNOSIS — Z12.11 SCREENING FOR COLON CANCER: ICD-10-CM

## 2025-02-26 DIAGNOSIS — R73.02 GLUCOSE INTOLERANCE (IMPAIRED GLUCOSE TOLERANCE): ICD-10-CM

## 2025-02-26 DIAGNOSIS — R91.1 PULMONARY NODULE: ICD-10-CM

## 2025-02-26 DIAGNOSIS — Z12.2 ENCOUNTER FOR SCREENING FOR LUNG CANCER: ICD-10-CM

## 2025-02-26 DIAGNOSIS — Z00.00 WELLNESS EXAMINATION: ICD-10-CM

## 2025-02-26 DIAGNOSIS — F17.210 CIGARETTE SMOKER: ICD-10-CM

## 2025-02-26 DIAGNOSIS — E78.2 MIXED HYPERLIPIDEMIA: ICD-10-CM

## 2025-02-26 PROCEDURE — 3008F BODY MASS INDEX DOCD: CPT | Mod: CPTII,S$GLB,, | Performed by: INTERNAL MEDICINE

## 2025-02-26 PROCEDURE — 99214 OFFICE O/P EST MOD 30 MIN: CPT | Mod: S$GLB,,, | Performed by: INTERNAL MEDICINE

## 2025-02-26 PROCEDURE — 99999 PR PBB SHADOW E&M-EST. PATIENT-LVL IV: CPT | Mod: PBBFAC,,, | Performed by: INTERNAL MEDICINE

## 2025-02-26 PROCEDURE — 3078F DIAST BP <80 MM HG: CPT | Mod: CPTII,S$GLB,, | Performed by: INTERNAL MEDICINE

## 2025-02-26 PROCEDURE — 3074F SYST BP LT 130 MM HG: CPT | Mod: CPTII,S$GLB,, | Performed by: INTERNAL MEDICINE

## 2025-02-26 PROCEDURE — 1160F RVW MEDS BY RX/DR IN RCRD: CPT | Mod: CPTII,S$GLB,, | Performed by: INTERNAL MEDICINE

## 2025-02-26 PROCEDURE — 3044F HG A1C LEVEL LT 7.0%: CPT | Mod: CPTII,S$GLB,, | Performed by: INTERNAL MEDICINE

## 2025-02-26 PROCEDURE — 1159F MED LIST DOCD IN RCRD: CPT | Mod: CPTII,S$GLB,, | Performed by: INTERNAL MEDICINE

## 2025-02-26 PROCEDURE — G2211 COMPLEX E/M VISIT ADD ON: HCPCS | Mod: S$GLB,,, | Performed by: INTERNAL MEDICINE

## 2025-02-26 NOTE — PROGRESS NOTES
"Subjective:       Patient ID: Candice Cohn is a 60 y.o. female.    Chief Complaint: Annual Exam   HPI     History of Present Illness            Gyn: Ablation no menses / Dr Asmita Horton  MM2023  Dexa:  Never  Colonoscopy:  10/2021 cologaurd negative   Immunizations: Flu: D Tdap:  Not on old note due Shingles: recommend    Smoker:  Yes  cessation discussed/ 24" nodule CT lung screen r/c 1 yr   Note in      F/u labs        Hypothyroid:  controlled //   Rx Levo 88  1/ pill  1 all other days.  TPO negative// US 2/15 negative.     Mixed hyperlipidemia: uncontrolled  L 151.   Rxfish oil-- add fiber supplement.   R/c consider CT calcium  //             highest L 184     Glucose intolerance:  Improved w compounded GLP 1.  A1c 5.0  / G 100 //   Rx Metformin , compounded Mounjaro                off $$ Ozempic didn't work as well     ALAN:  Dx 2019 / AEH high 28.  controlled Rx CPAP 5-20 auto PAP.  mgmt ENT Dr Graham      Metabolic syndrome: BMI 32/194, 31/189, 30/189, 25/160 - 157- Rx GLP compound   prev Qsymia  Goal 145-150  lbs            ____________________________________________________________________________________________________  Assessment & Plan:  1. Hypothyroidism, unspecified type  - TSH; Future    2. Mixed hyperlipidemia  - NMR Lipoprotein Profile; Future    3. Pulmonary nodule    4. Glucose intolerance (impaired glucose tolerance)  - Hemoglobin A1C; Future  - Comprehensive Metabolic Panel; Future    5. Screening for colon cancer  - Cologuard Screening (Multitarget Stool DNA); Future  - Cologuard Screening (Multitarget Stool DNA)    6. Cigarette smoker  - CT Chest Lung Screening Low Dose; Future    7. Encounter for screening for lung cancer  - CT Chest Lung Screening Low Dose; Future    8. Wellness examination  - TSH; Future  - Hemoglobin A1C; Future  - Comprehensive Metabolic Panel; Future  - CBC Without Differential; Future  - NMR Lipoprotein Profile; Future   "   Hypothyroidism, unspecified type  -     TSH; Future; Expected date: 02/26/2025    Mixed hyperlipidemia  -     NMR Lipoprotein Profile; Future; Expected date: 02/26/2025    Pulmonary nodule  Comments:  2024: Left upper lobe pleural based nodule measuring 5 mm    Glucose intolerance (impaired glucose tolerance)  -     Hemoglobin A1C; Future; Expected date: 02/26/2025  -     Comprehensive Metabolic Panel; Future; Expected date: 02/26/2025    Screening for colon cancer  -     Cologuard Screening (Multitarget Stool DNA); Future; Expected date: 02/26/2025    Cigarette smoker  -     CT Chest Lung Screening Low Dose; Future; Expected date: 09/22/2025    Encounter for screening for lung cancer  -     CT Chest Lung Screening Low Dose; Future; Expected date: 09/22/2025    Wellness examination  -     TSH; Future; Expected date: 02/26/2025  -     Hemoglobin A1C; Future; Expected date: 02/26/2025  -     Comprehensive Metabolic Panel; Future; Expected date: 02/26/2025  -     CBC Without Differential; Future; Expected date: 02/26/2025  -     NMR Lipoprotein Profile; Future; Expected date: 02/26/2025        Continue to work on maintain healthy weight, balanced diet. Avoid unhealthy habits: smoking/vaping, excessive alcohol intake.     Recommend diet exercise:  High protein, low fat, low carb diet - calories women under <1200 & men <1800, carbs <100 G, protein 50-60 G daily. Protein supplements to replace one meal.  Keep all beverages < 10 calories per serving. Keep snacks < 100 calories.   Recommend exercise 160 minutes per week, combo of cardio and weight/strength training.     Visit today included increased complexity associated with the care of the episodic problem addressed and managing the longitudinal care of the patient due to the serious and/or complex managed problem(s). HTN      Disclaimer: This note was partly generated using dictation software which may occasionally result in transcription  errors  ____________________________________________________________________________________________________  Review of Systems:  Review of Systems      Negative     Objective:     Wt Readings from Last 3 Encounters:   02/26/25 71.6 kg (157 lb 15.4 oz)   08/26/24 73 kg (160 lb 15 oz)   02/26/24 85.9 kg (189 lb 6 oz)     BP Readings from Last 3 Encounters:   02/26/25 124/68   08/26/24 104/64   02/26/24 138/72       Lab Results   Component Value Date    WBC 6.92 08/23/2024    HGB 15.0 08/23/2024    HCT 47.8 08/23/2024     08/23/2024     02/24/2025    K 4.3 02/24/2025     02/24/2025    ALT 9 (L) 02/24/2025    AST 14 02/24/2025    CO2 27 02/24/2025    CREATININE 0.64 02/24/2025    BUN 10 02/24/2025    GLU 91 02/24/2025      Hemoglobin A1C   Date Value Ref Range Status   02/24/2025 4.9 4.0 - 5.6 % Final     Comment:     Reference Interval:  5.0 - 5.6 Normal   5.7 - 6.4 High Risk   > 6.5 Diabetic      Hgb A1c results are standardized based on the (NGSP) National   Glycohemoglobin Standardization Program.      Hemoglobin A1C levels are related to mean serum/plasma glucose   during the preceding 2-3 months.        08/23/2024 5.0 0.0 - 5.6 % Final     Comment:     Reference Interval:  5.0 - 5.6 Normal   5.7 - 6.4 High Risk   > 6.5 Diabetic      Hgb A1c results are standardized based on the (NGSP) National   Glycohemoglobin Standardization Program.      Hemoglobin A1C levels are related to mean serum/plasma glucose   during the preceding 2-3 months.        02/24/2024 5.4 0.0 - 5.6 % Final     Comment:     Reference Interval:  5.0 - 5.6 Normal   5.7 - 6.4 High Risk   > 6.5 Diabetic      Hgb A1c results are standardized based on the (NGSP) National   Glycohemoglobin Standardization Program.      Hemoglobin A1C levels are related to mean serum/plasma glucose   during the preceding 2-3 months.           Lab Results   Component Value Date    TSH 2.397 02/24/2025    TSH 4.480 (H) 08/23/2024    TSH 1.600  02/24/2024     Lab Results   Component Value Date    FREET4 1.01 02/24/2025    FREET4 1.19 08/23/2024    FREET4 1.15 02/24/2024     Lab Results   Component Value Date    LDLCALC 151.8 02/24/2025    LDLCALC 165.2 (H) 08/23/2024    LDLCALC 123.2 02/24/2024     Lab Results   Component Value Date    TRIG 126 02/24/2025    TRIG 174 (H) 08/23/2024    TRIG 214 (H) 02/24/2024            Physical Exam      Constitutional:       Appearance: Normal appearance.   HENT:      Head: Normocephalic and atraumatic.   Eyes:      Extraocular Movements: Extraocular movements intact.      Pupils: Pupils are equal, round, and reactive to light.   Cardiovascular:      Rate and Rhythm: Normal rate.   Pulmonary:      Effort: Pulmonary effort is normal.   Neurological:      Mental Status: She is alert and oriented to person, place, and time.   Psychiatric:         Mood and Affect: Mood normal.     Medication List with Changes/Refills   Current Medications    BIOTIN ORAL    Take by mouth.    CALCIUM CARBONATE/VITAMIN D3 (VITAMIN D-3 ORAL)    Take by mouth.    CYANOCOBALAMIN, VITAMIN B-12, (VITAMIN B-12 ORAL)    Take by mouth.    FEXOFENADINE HCL (ALLEGRA ORAL)    Take by mouth.    LEVOTHYROXINE (SYNTHROID) 88 MCG TABLET    TAKE 1 TABLET BY MOUTH EVERY DAY    METFORMIN (GLUCOPHAGE-XR) 500 MG ER 24HR TABLET    TAKE 1 TABLET (500 MG TOTAL) BY MOUTH BEFORE EVENING MEAL.    OMEGA-3 FATTY ACIDS/FISH OIL (FISH OIL-OMEGA-3 FATTY ACIDS) 300-1,000 MG CAPSULE    Take by mouth once daily.    TIRZEPATIDE 7.5 MG/0.5 ML PNIJ    Inject 7.5 mg into the skin every 7 days.

## 2025-03-19 ENCOUNTER — RESULTS FOLLOW-UP (OUTPATIENT)
Dept: FAMILY MEDICINE | Facility: CLINIC | Age: 60
End: 2025-03-19

## 2025-07-15 ENCOUNTER — TELEPHONE (OUTPATIENT)
Dept: PHARMACY | Facility: CLINIC | Age: 60
End: 2025-07-15
Payer: COMMERCIAL

## 2025-07-15 NOTE — TELEPHONE ENCOUNTER
[facility]/Population Health Chart Review & Patient Outreach Details For Medication Adherence Project    Reason for Outreach Encounter: 3rd Party payor non-compliance report ([employer], [employer], [facility], etc)  2.  Patient Outreach Method: [employer] message  3.   Medication in question: metformin   LAST FILLED: 7/5/24 for 90 day supply  Diabetes Medications              metFORMIN (GLUCOPHAGE-XR) 500 MG ER 24hr tablet TAKE 1 TABLET (500 MG TOTAL) BY MOUTH BEFORE EVENING MEAL.    tirzepatide 7.5 mg/0.5 mL PnIj Inject 7.5 mg into the skin every 7 days.              4.  Reviewed and or Updates Made To: Patient Chart  5. Outreach Outcomes and/or actions taken: Sent inquiry to patient: Waiting for response.